# Patient Record
Sex: MALE | Race: WHITE | NOT HISPANIC OR LATINO | Employment: OTHER | URBAN - METROPOLITAN AREA
[De-identification: names, ages, dates, MRNs, and addresses within clinical notes are randomized per-mention and may not be internally consistent; named-entity substitution may affect disease eponyms.]

---

## 2023-02-15 PROBLEM — I25.118 CORONARY ARTERY DISEASE OF NATIVE ARTERY OF NATIVE HEART WITH STABLE ANGINA PECTORIS (CMS-HCC): Status: ACTIVE | Noted: 2023-02-15

## 2023-02-15 PROBLEM — M25.561 KNEE PAIN, RIGHT: Status: ACTIVE | Noted: 2023-02-15

## 2023-02-15 PROBLEM — I10 BENIGN ESSENTIAL HYPERTENSION: Status: ACTIVE | Noted: 2023-02-15

## 2023-02-15 PROBLEM — Z95.5 PRESENCE OF STENT IN CORONARY ARTERY: Status: ACTIVE | Noted: 2023-02-15

## 2023-02-15 PROBLEM — E78.5 DYSLIPIDEMIA, GOAL LDL BELOW 70: Status: ACTIVE | Noted: 2023-02-15

## 2023-02-15 PROBLEM — F10.20 UNCOMPLICATED ALCOHOL DEPENDENCE (MULTI): Status: ACTIVE | Noted: 2023-02-15

## 2023-02-15 PROBLEM — I25.10 ASHD (ARTERIOSCLEROTIC HEART DISEASE): Status: ACTIVE | Noted: 2023-02-15

## 2023-02-15 PROBLEM — L30.9 ECZEMA: Status: ACTIVE | Noted: 2023-02-15

## 2023-02-15 PROBLEM — F17.200 NEEDS SMOKING CESSATION EDUCATION: Status: ACTIVE | Noted: 2023-02-15

## 2023-02-15 PROBLEM — E55.9 VITAMIN D DEFICIENCY: Status: ACTIVE | Noted: 2023-02-15

## 2023-02-15 PROBLEM — F17.210 SMOKING GREATER THAN 40 PACK YEARS: Status: ACTIVE | Noted: 2023-02-15

## 2023-02-15 PROBLEM — E53.8 VITAMIN B12 DEFICIENCY: Status: ACTIVE | Noted: 2023-02-15

## 2023-02-15 PROBLEM — I73.9 PAD (PERIPHERAL ARTERY DISEASE) (CMS-HCC): Status: ACTIVE | Noted: 2023-02-15

## 2023-02-15 PROBLEM — F51.04 CHRONIC INSOMNIA: Status: ACTIVE | Noted: 2023-02-15

## 2023-02-15 PROBLEM — J44.89 COPD (CHRONIC OBSTRUCTIVE PULMONARY DISEASE) WITH CHRONIC BRONCHITIS (MULTI): Status: ACTIVE | Noted: 2023-02-15

## 2023-02-15 PROBLEM — I70.222 ATHEROSCLEROSIS OF NATIVE ARTERY OF LEFT LOWER EXTREMITY WITH REST PAIN (MULTI): Status: ACTIVE | Noted: 2023-02-15

## 2023-02-15 PROBLEM — M23.306 DEGENERATIVE TEAR OF MENISCUS OF RIGHT KNEE: Status: ACTIVE | Noted: 2023-02-15

## 2023-02-15 PROBLEM — J33.9 NASAL POLYPS: Status: ACTIVE | Noted: 2023-02-15

## 2023-02-15 PROBLEM — I73.9 CLAUDICATION, INTERMITTENT (CMS-HCC): Status: ACTIVE | Noted: 2023-02-15

## 2023-02-15 PROBLEM — R04.0 EPISTAXIS: Status: ACTIVE | Noted: 2023-02-15

## 2023-02-15 RX ORDER — ISOSORBIDE MONONITRATE 30 MG/1
1 TABLET, EXTENDED RELEASE ORAL
COMMUNITY
Start: 2019-12-03 | End: 2023-07-03

## 2023-02-15 RX ORDER — MAGNESIUM 200 MG
1 TABLET ORAL DAILY
COMMUNITY
Start: 2021-07-16

## 2023-02-15 RX ORDER — IPRATROPIUM BROMIDE AND ALBUTEROL SULFATE 2.5; .5 MG/3ML; MG/3ML
3 SOLUTION RESPIRATORY (INHALATION)
COMMUNITY
Start: 2020-02-24

## 2023-02-15 RX ORDER — MOMETASONE FUROATE 1 MG/G
OINTMENT TOPICAL
COMMUNITY
Start: 2021-01-25 | End: 2023-03-28 | Stop reason: SDUPTHER

## 2023-02-15 RX ORDER — CHOLECALCIFEROL (VITAMIN D3) 125 MCG
1 CAPSULE ORAL DAILY
COMMUNITY
Start: 2021-07-16

## 2023-02-15 RX ORDER — ATORVASTATIN CALCIUM 80 MG/1
1 TABLET, FILM COATED ORAL NIGHTLY
COMMUNITY
Start: 2018-04-18 | End: 2024-04-01 | Stop reason: SDUPTHER

## 2023-02-15 RX ORDER — TRAZODONE HYDROCHLORIDE 100 MG/1
1 TABLET ORAL NIGHTLY
COMMUNITY
Start: 2020-12-31 | End: 2023-07-31

## 2023-02-15 RX ORDER — LOSARTAN POTASSIUM 50 MG/1
50 TABLET ORAL EVERY 12 HOURS
COMMUNITY
Start: 2020-06-30

## 2023-02-15 RX ORDER — METOPROLOL TARTRATE 50 MG/1
1 TABLET ORAL EVERY 12 HOURS
COMMUNITY
Start: 2022-04-04 | End: 2023-07-31

## 2023-02-15 RX ORDER — BUDESONIDE AND FORMOTEROL FUMARATE DIHYDRATE 160; 4.5 UG/1; UG/1
2 AEROSOL RESPIRATORY (INHALATION) 2 TIMES DAILY
COMMUNITY
Start: 2020-09-18 | End: 2024-03-11

## 2023-02-15 RX ORDER — CLOPIDOGREL BISULFATE 75 MG/1
75 TABLET ORAL DAILY
COMMUNITY
Start: 2018-03-21

## 2023-02-15 RX ORDER — MUPIROCIN 20 MG/G
OINTMENT TOPICAL 3 TIMES DAILY
COMMUNITY
Start: 2020-05-15 | End: 2024-06-03 | Stop reason: WASHOUT

## 2023-02-15 RX ORDER — AMLODIPINE BESYLATE 10 MG/1
1 TABLET ORAL DAILY
COMMUNITY
Start: 2020-07-28 | End: 2023-09-12

## 2023-02-15 RX ORDER — ASPIRIN 81 MG/1
1 TABLET ORAL DAILY
COMMUNITY
Start: 2018-03-21

## 2023-02-15 RX ORDER — CYCLOBENZAPRINE HCL 10 MG
1 TABLET ORAL NIGHTLY PRN
COMMUNITY
Start: 2020-06-30 | End: 2023-07-20 | Stop reason: SDUPTHER

## 2023-03-15 LAB — CALCIDIOL (25 OH VITAMIN D3) (NG/ML) IN SER/PLAS: 33 NG/ML

## 2023-03-28 ENCOUNTER — TELEPHONE (OUTPATIENT)
Dept: PRIMARY CARE | Facility: CLINIC | Age: 63
End: 2023-03-28
Payer: COMMERCIAL

## 2023-03-28 DIAGNOSIS — L30.8 OTHER ECZEMA: ICD-10-CM

## 2023-03-28 RX ORDER — MOMETASONE FUROATE 1 MG/G
OINTMENT TOPICAL
Qty: 60 G | Refills: 1 | Status: SHIPPED | OUTPATIENT
Start: 2023-03-28 | End: 2023-11-13

## 2023-04-03 ENCOUNTER — TELEPHONE (OUTPATIENT)
Dept: PRIMARY CARE | Facility: CLINIC | Age: 63
End: 2023-04-03
Payer: COMMERCIAL

## 2023-04-03 NOTE — TELEPHONE ENCOUNTER
Voice mail message :  Mometasone Furoate 0.1% ointment was sent in    The Ointment is not available  /  they do have the creme    Is the creme okay ?

## 2023-04-06 ENCOUNTER — OFFICE VISIT (OUTPATIENT)
Dept: PRIMARY CARE | Facility: CLINIC | Age: 63
End: 2023-04-06
Payer: COMMERCIAL

## 2023-04-06 VITALS
WEIGHT: 173 LBS | SYSTOLIC BLOOD PRESSURE: 112 MMHG | HEART RATE: 62 BPM | BODY MASS INDEX: 27.15 KG/M2 | DIASTOLIC BLOOD PRESSURE: 60 MMHG | HEIGHT: 67 IN

## 2023-04-06 DIAGNOSIS — J44.89 COPD (CHRONIC OBSTRUCTIVE PULMONARY DISEASE) WITH CHRONIC BRONCHITIS (MULTI): ICD-10-CM

## 2023-04-06 DIAGNOSIS — F17.210 SMOKING GREATER THAN 40 PACK YEARS: ICD-10-CM

## 2023-04-06 DIAGNOSIS — J18.9 COMMUNITY ACQUIRED PNEUMONIA OF LEFT LOWER LOBE OF LUNG: Primary | ICD-10-CM

## 2023-04-06 DIAGNOSIS — I73.9 PAD (PERIPHERAL ARTERY DISEASE) (CMS-HCC): ICD-10-CM

## 2023-04-06 DIAGNOSIS — F17.200 NEEDS SMOKING CESSATION EDUCATION: ICD-10-CM

## 2023-04-06 DIAGNOSIS — I25.118 CORONARY ARTERY DISEASE OF NATIVE ARTERY OF NATIVE HEART WITH STABLE ANGINA PECTORIS (CMS-HCC): ICD-10-CM

## 2023-04-06 DIAGNOSIS — F10.20 UNCOMPLICATED ALCOHOL DEPENDENCE (MULTI): ICD-10-CM

## 2023-04-06 DIAGNOSIS — I10 BENIGN ESSENTIAL HYPERTENSION: ICD-10-CM

## 2023-04-06 DIAGNOSIS — I70.222 ATHEROSCLEROSIS OF NATIVE ARTERY OF LEFT LOWER EXTREMITY WITH REST PAIN (MULTI): ICD-10-CM

## 2023-04-06 DIAGNOSIS — E78.5 DYSLIPIDEMIA, GOAL LDL BELOW 70: ICD-10-CM

## 2023-04-06 DIAGNOSIS — I73.9 CLAUDICATION, INTERMITTENT (CMS-HCC): ICD-10-CM

## 2023-04-06 PROBLEM — R04.0 EPISTAXIS: Status: RESOLVED | Noted: 2023-02-15 | Resolved: 2023-04-06

## 2023-04-06 PROBLEM — M25.561 KNEE PAIN, RIGHT: Status: RESOLVED | Noted: 2023-02-15 | Resolved: 2023-04-06

## 2023-04-06 PROBLEM — I25.10 ASHD (ARTERIOSCLEROTIC HEART DISEASE): Status: RESOLVED | Noted: 2023-02-15 | Resolved: 2023-04-06

## 2023-04-06 PROCEDURE — 3078F DIAST BP <80 MM HG: CPT | Performed by: INTERNAL MEDICINE

## 2023-04-06 PROCEDURE — 99406 BEHAV CHNG SMOKING 3-10 MIN: CPT | Performed by: INTERNAL MEDICINE

## 2023-04-06 PROCEDURE — 99213 OFFICE O/P EST LOW 20 MIN: CPT | Performed by: INTERNAL MEDICINE

## 2023-04-06 PROCEDURE — 3074F SYST BP LT 130 MM HG: CPT | Performed by: INTERNAL MEDICINE

## 2023-04-06 RX ORDER — ALBUTEROL SULFATE 90 UG/1
2 AEROSOL, METERED RESPIRATORY (INHALATION) EVERY 4 HOURS PRN
Qty: 18 G | Refills: 11 | Status: SHIPPED | OUTPATIENT
Start: 2023-04-06 | End: 2023-04-11 | Stop reason: SDUPTHER

## 2023-04-06 RX ORDER — ALBUTEROL SULFATE 90 UG/1
2 AEROSOL, METERED RESPIRATORY (INHALATION) EVERY 4 HOURS PRN
COMMUNITY
End: 2023-04-06 | Stop reason: SDUPTHER

## 2023-04-06 ASSESSMENT — ANXIETY QUESTIONNAIRES
7. FEELING AFRAID AS IF SOMETHING AWFUL MIGHT HAPPEN: NOT AT ALL
2. NOT BEING ABLE TO STOP OR CONTROL WORRYING: NOT AT ALL
GAD7 TOTAL SCORE: 0
1. FEELING NERVOUS, ANXIOUS, OR ON EDGE: NOT AT ALL
4. TROUBLE RELAXING: NOT AT ALL
3. WORRYING TOO MUCH ABOUT DIFFERENT THINGS: NOT AT ALL
6. BECOMING EASILY ANNOYED OR IRRITABLE: NOT AT ALL
5. BEING SO RESTLESS THAT IT IS HARD TO SIT STILL: NOT AT ALL
IF YOU CHECKED OFF ANY PROBLEMS ON THIS QUESTIONNAIRE, HOW DIFFICULT HAVE THESE PROBLEMS MADE IT FOR YOU TO DO YOUR WORK, TAKE CARE OF THINGS AT HOME, OR GET ALONG WITH OTHER PEOPLE: NOT DIFFICULT AT ALL

## 2023-04-06 ASSESSMENT — PATIENT HEALTH QUESTIONNAIRE - PHQ9
1. LITTLE INTEREST OR PLEASURE IN DOING THINGS: NOT AT ALL
SUM OF ALL RESPONSES TO PHQ9 QUESTIONS 1 AND 2: 0
2. FEELING DOWN, DEPRESSED OR HOPELESS: NOT AT ALL

## 2023-04-06 ASSESSMENT — ENCOUNTER SYMPTOMS
OCCASIONAL FEELINGS OF UNSTEADINESS: 0
SHORTNESS OF BREATH: 1
COUGH: 1
LOSS OF SENSATION IN FEET: 0
DEPRESSION: 0

## 2023-04-06 ASSESSMENT — COLUMBIA-SUICIDE SEVERITY RATING SCALE - C-SSRS
1. IN THE PAST MONTH, HAVE YOU WISHED YOU WERE DEAD OR WISHED YOU COULD GO TO SLEEP AND NOT WAKE UP?: NO
6. HAVE YOU EVER DONE ANYTHING, STARTED TO DO ANYTHING, OR PREPARED TO DO ANYTHING TO END YOUR LIFE?: NO
2. HAVE YOU ACTUALLY HAD ANY THOUGHTS OF KILLING YOURSELF?: NO

## 2023-04-06 NOTE — ASSESSMENT & PLAN NOTE
Continue on atorvastatin maximal dose 80 mg daily with aspirin 81 mg daily with history of cardiovascular disease with stent severe peripheral vascular disease lower extremities

## 2023-04-06 NOTE — PROGRESS NOTES
"Subjective   Reason for Visit: Yadiel Basurto is an 62 y.o. male here for a Medicare Wellness visit.     Past Medical, Surgical, and Family History reviewed and updated in chart.    Reviewed all medications by prescribing practitioner or clinical pharmacist (such as prescriptions, OTCs, herbal therapies and supplements) and documented in the medical record.    Recently evaluated in the emergency room for pleuritic pain left lobe of lung infiltrates consistent with new pneumonia complete antibiotic course of therapy with nebulizer feeling better continues to smoke half pack cigarettes a day trying to quit        Patient Care Team:  Link Fajardo DO as PCP - General  Link Fajardo DO as PCP - [employer] PCP     Review of Systems   Respiratory:  Positive for cough and shortness of breath.        Objective   Vitals:  /60   Pulse 62   Ht 1.702 m (5' 7\")   Wt 78.5 kg (173 lb)   BMI 27.10 kg/m²       Physical Exam  Vitals and nursing note reviewed.   Constitutional:       General: He is not in acute distress.     Appearance: Normal appearance. He is well-developed. He is not toxic-appearing.   HENT:      Head: Normocephalic and atraumatic.      Right Ear: Tympanic membrane and external ear normal.      Left Ear: Tympanic membrane and external ear normal.      Nose: Nose normal.      Mouth/Throat:      Mouth: Mucous membranes are moist.      Pharynx: Oropharynx is clear. No oropharyngeal exudate or posterior oropharyngeal erythema.      Tonsils: No tonsillar exudate. 2+ on the right. 2+ on the left.   Eyes:      Extraocular Movements: Extraocular movements intact.      Conjunctiva/sclera: Conjunctivae normal.   Cardiovascular:      Rate and Rhythm: Normal rate and regular rhythm.      Pulses: Normal pulses.      Heart sounds: Normal heart sounds. No murmur heard.  Pulmonary:      Effort: Pulmonary effort is normal.      Breath sounds: Rhonchi present.   Abdominal:      General: Abdomen " is flat. Bowel sounds are normal.      Palpations: Abdomen is soft.   Musculoskeletal:      Cervical back: Neck supple.   Feet:      Right foot:      Skin integrity: Skin integrity normal. No ulcer, blister, skin breakdown, erythema, warmth or callus.      Toenail Condition: Right toenails are normal.      Left foot:      Skin integrity: Skin integrity normal. No ulcer, blister, skin breakdown, erythema, warmth or callus.      Toenail Condition: Left toenails are normal.   Lymphadenopathy:      Cervical: No cervical adenopathy.   Skin:     General: Skin is warm and dry.      Capillary Refill: Capillary refill takes more than 3 seconds.      Findings: No rash.   Neurological:      Mental Status: He is alert. Mental status is at baseline.      Sensory: Sensation is intact.   Psychiatric:         Mood and Affect: Mood normal.         Behavior: Behavior normal.         Thought Content: Thought content normal.         Judgment: Judgment normal.         Assessment/Plan   Problem List Items Addressed This Visit          Respiratory    COPD (chronic obstructive pulmonary disease) with chronic bronchitis (CMS/HCC)    Current Assessment & Plan     With nebulizers at home smoking cessationIncrease Symbicort to 2 puffs twice a day         Relevant Medications    albuterol 90 mcg/actuation inhaler    Other Relevant Orders    Comprehensive metabolic panel    CBC and Auto Differential    Lipid Panel    Hepatitis C antibody    CT lung screening low dose    Community acquired pneumonia of left lung - Primary    Current Assessment & Plan     Completed antibiotic course refill albuterol continue with nebulizer treatments at home         Relevant Medications    albuterol 90 mcg/actuation inhaler    Other Relevant Orders    Comprehensive metabolic panel    CBC and Auto Differential    Lipid Panel    Hepatitis C antibody    CT lung screening low dose       Circulatory    Benign essential hypertension    Coronary artery disease of native  artery of native heart with stable angina pectoris (CMS/HCC)    PAD (peripheral artery disease) (CMS/HCC)    Current Assessment & Plan     History of stent lower extremity circulation continue with aspirin and clopidogrel indefinitely         RESOLVED: Atherosclerosis of native artery of left lower extremity with rest pain (CMS/HCC)       Musculoskeletal    Claudication, intermittent (CMS/HCC)    Current Assessment & Plan     Continue with smoking cessation and regular exercise program with walking            Other    Dyslipidemia, goal LDL below 70    Current Assessment & Plan     Continue on atorvastatin maximal dose 80 mg daily with aspirin 81 mg daily with history of cardiovascular disease with stent severe peripheral vascular disease lower extremities         Relevant Orders    Lipid Panel    Needs smoking cessation education    Current Assessment & Plan     Smoking cessation education given         Relevant Orders    Comprehensive metabolic panel    CBC and Auto Differential    Lipid Panel    Hepatitis C antibody    CT lung screening low dose    Smoking greater than 40 pack years    Current Assessment & Plan     Has cut down to half pack of cigarettes a day continues to smoke given smoking cessation education scheduled surveillance CAT scan for lung cancer screening that was due in February         Relevant Medications    albuterol 90 mcg/actuation inhaler    Other Relevant Orders    CT lung screening low dose    Uncomplicated alcohol dependence (CMS/HCC)    Current Assessment & Plan     Continue to encourage reduction in alcohol intake

## 2023-04-06 NOTE — PROGRESS NOTES
"Subjective   Patient ID: Yadiel Basurto is a 62 y.o. male who presents for Follow-up.    HPI     Review of Systems    Objective   /60   Pulse 62   Ht 1.702 m (5' 7\")   Wt 78.5 kg (173 lb)   BMI 27.10 kg/m²     Physical Exam    Assessment/Plan          "

## 2023-04-06 NOTE — ASSESSMENT & PLAN NOTE
>>ASSESSMENT AND PLAN FOR PAD (PERIPHERAL ARTERY DISEASE) (CMS/[facility]) WRITTEN ON 4/6/2023  1:20 PM BY NESHA ENRIQUEZ, DO    History of stent lower extremity circulation continue with aspirin and clopidogrel indefinitely    >>ASSESSMENT AND PLAN FOR CLAUDICATION, INTERMITTENT (CMS/[facility]) WRITTEN ON 4/6/2023  1:20 PM BY NESHA ENRIQUEZ, DO    Continue with smoking cessation and regular exercise program with walking

## 2023-04-06 NOTE — ASSESSMENT & PLAN NOTE
Has cut down to half pack of cigarettes a day continues to smoke given smoking cessation education scheduled surveillance CAT scan for lung cancer screening that was due in February

## 2023-04-10 ENCOUNTER — TELEPHONE (OUTPATIENT)
Dept: PRIMARY CARE | Facility: CLINIC | Age: 63
End: 2023-04-10
Payer: COMMERCIAL

## 2023-04-10 DIAGNOSIS — J44.89 COPD (CHRONIC OBSTRUCTIVE PULMONARY DISEASE) WITH CHRONIC BRONCHITIS (MULTI): ICD-10-CM

## 2023-04-10 DIAGNOSIS — J18.9 COMMUNITY ACQUIRED PNEUMONIA OF LEFT LOWER LOBE OF LUNG: ICD-10-CM

## 2023-04-10 DIAGNOSIS — F17.210 SMOKING GREATER THAN 40 PACK YEARS: ICD-10-CM

## 2023-04-10 NOTE — TELEPHONE ENCOUNTER
Albuterol inhaler is not covered by his insurance    [employer] HFA is covered    Luis Alberto Elmore

## 2023-04-11 RX ORDER — ALBUTEROL SULFATE 90 UG/1
2 AEROSOL, METERED RESPIRATORY (INHALATION) EVERY 4 HOURS PRN
Qty: 18 G | Refills: 3 | Status: SHIPPED | OUTPATIENT
Start: 2023-04-11 | End: 2024-04-10

## 2023-04-21 ENCOUNTER — TELEPHONE (OUTPATIENT)
Dept: PRIMARY CARE | Facility: CLINIC | Age: 63
End: 2023-04-21
Payer: COMMERCIAL

## 2023-04-21 RX ORDER — LOSARTAN POTASSIUM 50 MG/1
TABLET ORAL EVERY 12 HOURS
COMMUNITY
Start: 2020-06-30 | End: 2023-10-06 | Stop reason: SDUPTHER

## 2023-04-21 RX ORDER — ALBUTEROL SULFATE 0.83 MG/ML
SOLUTION RESPIRATORY (INHALATION) EVERY 4 HOURS PRN
COMMUNITY
Start: 2023-04-10

## 2023-04-21 RX ORDER — DOXYCYCLINE HYCLATE 100 MG
1 TABLET ORAL EVERY 12 HOURS
COMMUNITY
Start: 2022-12-15 | End: 2023-04-24

## 2023-04-21 RX ORDER — MUPIROCIN 20 MG/G
OINTMENT TOPICAL
COMMUNITY
Start: 2020-05-15 | End: 2024-06-03 | Stop reason: WASHOUT

## 2023-04-21 NOTE — TELEPHONE ENCOUNTER
Patient stated his left side has been bothering him for the past month. He went to the ER and was given antibiotic (Diagnosed with pneumonia)-he was starting to feel better but the past week it's really been bothering him.       Erika

## 2023-04-22 DIAGNOSIS — J18.9 COMMUNITY ACQUIRED PNEUMONIA OF LEFT LOWER LOBE OF LUNG: Primary | ICD-10-CM

## 2023-04-24 RX ORDER — DOXYCYCLINE HYCLATE 100 MG
TABLET ORAL
Qty: 20 TABLET | Refills: 0 | Status: SHIPPED
Start: 2023-04-24 | End: 2023-10-06 | Stop reason: ALTCHOICE

## 2023-05-10 ENCOUNTER — TELEPHONE (OUTPATIENT)
Dept: PRIMARY CARE | Facility: CLINIC | Age: 63
End: 2023-05-10
Payer: COMMERCIAL

## 2023-05-10 DIAGNOSIS — J44.89 COPD (CHRONIC OBSTRUCTIVE PULMONARY DISEASE) WITH CHRONIC BRONCHITIS (MULTI): ICD-10-CM

## 2023-05-10 DIAGNOSIS — I10 BENIGN ESSENTIAL HYPERTENSION: ICD-10-CM

## 2023-05-10 DIAGNOSIS — E78.5 DYSLIPIDEMIA, GOAL LDL BELOW 70: ICD-10-CM

## 2023-05-10 SDOH — ECONOMIC STABILITY: FOOD INSECURITY: WITHIN THE PAST 12 MONTHS, THE FOOD YOU BOUGHT JUST DIDN'T LAST AND YOU DIDN'T HAVE MONEY TO GET MORE.: OFTEN TRUE

## 2023-05-10 SDOH — ECONOMIC STABILITY: FOOD INSECURITY: WITHIN THE PAST 12 MONTHS, YOU WORRIED THAT YOUR FOOD WOULD RUN OUT BEFORE YOU GOT MONEY TO BUY MORE.: OFTEN TRUE

## 2023-06-21 ENCOUNTER — TELEPHONE (OUTPATIENT)
Dept: PRIMARY CARE | Facility: CLINIC | Age: 63
End: 2023-06-21
Payer: COMMERCIAL

## 2023-06-21 DIAGNOSIS — F17.210 SMOKING GREATER THAN 40 PACK YEARS: Primary | ICD-10-CM

## 2023-06-21 NOTE — TELEPHONE ENCOUNTER
He had to cancel his low dose CT because of issue with his insurance.    He has things straightened out & needs new order , so he can schedule again

## 2023-07-03 DIAGNOSIS — I25.118 CORONARY ARTERY DISEASE OF NATIVE ARTERY OF NATIVE HEART WITH STABLE ANGINA PECTORIS (CMS-HCC): Primary | ICD-10-CM

## 2023-07-03 RX ORDER — ISOSORBIDE MONONITRATE 30 MG/1
TABLET, EXTENDED RELEASE ORAL
Qty: 90 TABLET | Refills: 3 | Status: SHIPPED | OUTPATIENT
Start: 2023-07-03

## 2023-07-03 RX ORDER — LOSARTAN POTASSIUM 50 MG/1
TABLET ORAL
Qty: 180 TABLET | Refills: 3 | Status: SHIPPED
Start: 2023-07-03 | End: 2023-10-06 | Stop reason: SDUPTHER

## 2023-07-19 DIAGNOSIS — G89.29 CHRONIC BILATERAL LOW BACK PAIN WITHOUT SCIATICA: Primary | ICD-10-CM

## 2023-07-19 DIAGNOSIS — M54.50 CHRONIC BILATERAL LOW BACK PAIN WITHOUT SCIATICA: Primary | ICD-10-CM

## 2023-07-20 RX ORDER — CYCLOBENZAPRINE HCL 10 MG
10 TABLET ORAL NIGHTLY PRN
Qty: 90 TABLET | Refills: 3 | Status: SHIPPED | OUTPATIENT
Start: 2023-07-20

## 2023-07-31 DIAGNOSIS — I25.118 CORONARY ARTERY DISEASE OF NATIVE ARTERY OF NATIVE HEART WITH STABLE ANGINA PECTORIS (CMS-HCC): ICD-10-CM

## 2023-07-31 DIAGNOSIS — F51.04 CHRONIC INSOMNIA: Primary | ICD-10-CM

## 2023-07-31 RX ORDER — TRAZODONE HYDROCHLORIDE 100 MG/1
100 TABLET ORAL NIGHTLY
Qty: 90 TABLET | Refills: 3 | Status: SHIPPED | OUTPATIENT
Start: 2023-07-31

## 2023-07-31 RX ORDER — METOPROLOL TARTRATE 50 MG/1
50 TABLET ORAL EVERY 12 HOURS
Qty: 180 TABLET | Refills: 3 | Status: SHIPPED | OUTPATIENT
Start: 2023-07-31

## 2023-09-12 DIAGNOSIS — I10 BENIGN ESSENTIAL HYPERTENSION: Primary | ICD-10-CM

## 2023-09-12 RX ORDER — AMLODIPINE BESYLATE 10 MG/1
10 TABLET ORAL DAILY
Qty: 90 TABLET | Refills: 3 | Status: SHIPPED | OUTPATIENT
Start: 2023-09-12

## 2023-09-30 ENCOUNTER — HOSPITAL ENCOUNTER (OUTPATIENT)
Dept: RADIOLOGY | Facility: HOSPITAL | Age: 63
Discharge: HOME | End: 2023-09-30
Payer: COMMERCIAL

## 2023-09-30 DIAGNOSIS — F17.210 NICOTINE DEPENDENCE, CIGARETTES, UNCOMPLICATED: ICD-10-CM

## 2023-09-30 PROCEDURE — 71271 CT THORAX LUNG CANCER SCR C-: CPT | Performed by: RADIOLOGY

## 2023-09-30 PROCEDURE — 71271 CT THORAX LUNG CANCER SCR C-: CPT

## 2023-10-05 ENCOUNTER — LAB (OUTPATIENT)
Dept: LAB | Facility: LAB | Age: 63
End: 2023-10-05
Payer: COMMERCIAL

## 2023-10-05 DIAGNOSIS — E78.5 HYPERLIPIDEMIA, UNSPECIFIED: Primary | ICD-10-CM

## 2023-10-05 DIAGNOSIS — J44.89 COPD (CHRONIC OBSTRUCTIVE PULMONARY DISEASE) WITH CHRONIC BRONCHITIS (MULTI): ICD-10-CM

## 2023-10-05 DIAGNOSIS — F17.200 NEEDS SMOKING CESSATION EDUCATION: ICD-10-CM

## 2023-10-05 DIAGNOSIS — Z23 FLU VACCINE NEED: ICD-10-CM

## 2023-10-05 DIAGNOSIS — J18.9 COMMUNITY ACQUIRED PNEUMONIA OF LEFT LOWER LOBE OF LUNG: ICD-10-CM

## 2023-10-05 LAB
BASOPHILS # BLD AUTO: 0.07 X10*3/UL (ref 0–0.1)
BASOPHILS NFR BLD AUTO: 0.7 %
EOSINOPHIL # BLD AUTO: 0.12 X10*3/UL (ref 0–0.7)
EOSINOPHIL NFR BLD AUTO: 1.1 %
ERYTHROCYTE [DISTWIDTH] IN BLOOD BY AUTOMATED COUNT: 14.4 % (ref 11.5–14.5)
HCT VFR BLD AUTO: 46.8 % (ref 41–52)
HCV AB SER QL: NONREACTIVE
HGB BLD-MCNC: 15.4 G/DL (ref 13.5–17.5)
IMM GRANULOCYTES # BLD AUTO: 0.07 X10*3/UL (ref 0–0.7)
IMM GRANULOCYTES NFR BLD AUTO: 0.7 % (ref 0–0.9)
LYMPHOCYTES # BLD AUTO: 1.7 X10*3/UL (ref 1.2–4.8)
LYMPHOCYTES NFR BLD AUTO: 16 %
MCH RBC QN AUTO: 34.2 PG (ref 26–34)
MCHC RBC AUTO-ENTMCNC: 32.9 G/DL (ref 32–36)
MCV RBC AUTO: 104 FL (ref 80–100)
MONOCYTES # BLD AUTO: 0.78 X10*3/UL (ref 0.1–1)
MONOCYTES NFR BLD AUTO: 7.3 %
NEUTROPHILS # BLD AUTO: 7.9 X10*3/UL (ref 1.2–7.7)
NEUTROPHILS NFR BLD AUTO: 74.2 %
NRBC BLD-RTO: 0 /100 WBCS (ref 0–0)
PLATELET # BLD AUTO: 291 X10*3/UL (ref 150–450)
PMV BLD AUTO: 9.5 FL (ref 7.5–11.5)
RBC # BLD AUTO: 4.5 X10*6/UL (ref 4.5–5.9)
WBC # BLD AUTO: 10.6 X10*3/UL (ref 4.4–11.3)

## 2023-10-05 PROCEDURE — 36415 COLL VENOUS BLD VENIPUNCTURE: CPT

## 2023-10-05 PROCEDURE — 86803 HEPATITIS C AB TEST: CPT

## 2023-10-05 PROCEDURE — 85025 COMPLETE CBC W/AUTO DIFF WBC: CPT

## 2023-10-06 ENCOUNTER — OFFICE VISIT (OUTPATIENT)
Dept: PRIMARY CARE | Facility: CLINIC | Age: 63
End: 2023-10-06
Payer: COMMERCIAL

## 2023-10-06 VITALS
SYSTOLIC BLOOD PRESSURE: 122 MMHG | BODY MASS INDEX: 27.31 KG/M2 | HEIGHT: 67 IN | HEART RATE: 68 BPM | DIASTOLIC BLOOD PRESSURE: 70 MMHG | WEIGHT: 174 LBS

## 2023-10-06 DIAGNOSIS — I25.118 CORONARY ARTERY DISEASE OF NATIVE ARTERY OF NATIVE HEART WITH STABLE ANGINA PECTORIS (CMS-HCC): ICD-10-CM

## 2023-10-06 DIAGNOSIS — I73.9 PAD (PERIPHERAL ARTERY DISEASE) (CMS-HCC): ICD-10-CM

## 2023-10-06 DIAGNOSIS — I10 BENIGN ESSENTIAL HYPERTENSION: ICD-10-CM

## 2023-10-06 DIAGNOSIS — I73.9 CLAUDICATION, INTERMITTENT (CMS-HCC): ICD-10-CM

## 2023-10-06 DIAGNOSIS — E78.5 DYSLIPIDEMIA, GOAL LDL BELOW 70: ICD-10-CM

## 2023-10-06 DIAGNOSIS — F17.200 NEEDS SMOKING CESSATION EDUCATION: ICD-10-CM

## 2023-10-06 DIAGNOSIS — I70.222 ATHEROSCLEROSIS OF NATIVE ARTERY OF LEFT LOWER EXTREMITY WITH REST PAIN (MULTI): ICD-10-CM

## 2023-10-06 DIAGNOSIS — Z23 FLU VACCINE NEED: ICD-10-CM

## 2023-10-06 DIAGNOSIS — J44.89 COPD (CHRONIC OBSTRUCTIVE PULMONARY DISEASE) WITH CHRONIC BRONCHITIS (MULTI): ICD-10-CM

## 2023-10-06 DIAGNOSIS — Z00.00 MEDICARE ANNUAL WELLNESS VISIT, SUBSEQUENT: Primary | ICD-10-CM

## 2023-10-06 DIAGNOSIS — J01.01 ACUTE RECURRENT MAXILLARY SINUSITIS: ICD-10-CM

## 2023-10-06 DIAGNOSIS — F10.20 UNCOMPLICATED ALCOHOL DEPENDENCE (MULTI): ICD-10-CM

## 2023-10-06 PROBLEM — J18.9 COMMUNITY ACQUIRED PNEUMONIA OF LEFT LUNG: Status: RESOLVED | Noted: 2023-04-06 | Resolved: 2023-10-06

## 2023-10-06 PROBLEM — I70.229: Status: ACTIVE | Noted: 2023-10-06

## 2023-10-06 PROBLEM — N40.0 BENIGN PROSTATIC HYPERPLASIA WITHOUT LOWER URINARY TRACT SYMPTOMS: Status: ACTIVE | Noted: 2023-10-06

## 2023-10-06 PROCEDURE — 99214 OFFICE O/P EST MOD 30 MIN: CPT | Performed by: INTERNAL MEDICINE

## 2023-10-06 PROCEDURE — 99497 ADVNCD CARE PLAN 30 MIN: CPT | Performed by: INTERNAL MEDICINE

## 2023-10-06 PROCEDURE — G0442 ANNUAL ALCOHOL SCREEN 15 MIN: HCPCS | Performed by: INTERNAL MEDICINE

## 2023-10-06 PROCEDURE — 99406 BEHAV CHNG SMOKING 3-10 MIN: CPT | Performed by: INTERNAL MEDICINE

## 2023-10-06 PROCEDURE — G0444 DEPRESSION SCREEN ANNUAL: HCPCS | Performed by: INTERNAL MEDICINE

## 2023-10-06 PROCEDURE — 90686 IIV4 VACC NO PRSV 0.5 ML IM: CPT | Performed by: INTERNAL MEDICINE

## 2023-10-06 PROCEDURE — 3074F SYST BP LT 130 MM HG: CPT | Performed by: INTERNAL MEDICINE

## 2023-10-06 PROCEDURE — 3078F DIAST BP <80 MM HG: CPT | Performed by: INTERNAL MEDICINE

## 2023-10-06 PROCEDURE — G0008 ADMIN INFLUENZA VIRUS VAC: HCPCS | Performed by: INTERNAL MEDICINE

## 2023-10-06 PROCEDURE — G0446 INTENS BEHAVE THER CARDIO DX: HCPCS | Performed by: INTERNAL MEDICINE

## 2023-10-06 PROCEDURE — G0439 PPPS, SUBSEQ VISIT: HCPCS | Performed by: INTERNAL MEDICINE

## 2023-10-06 RX ORDER — CILOSTAZOL 100 MG/1
100 TABLET ORAL 2 TIMES DAILY
Qty: 60 TABLET | Refills: 11 | Status: SHIPPED | OUTPATIENT
Start: 2023-10-06 | End: 2024-10-05

## 2023-10-06 RX ORDER — CEFDINIR 300 MG/1
300 CAPSULE ORAL 2 TIMES DAILY
Qty: 20 CAPSULE | Refills: 0 | Status: SHIPPED | OUTPATIENT
Start: 2023-10-06 | End: 2023-10-17

## 2023-10-06 ASSESSMENT — ENCOUNTER SYMPTOMS
OCCASIONAL FEELINGS OF UNSTEADINESS: 0
LOSS OF SENSATION IN FEET: 0
DEPRESSION: 0

## 2023-10-06 ASSESSMENT — ANXIETY QUESTIONNAIRES
2. NOT BEING ABLE TO STOP OR CONTROL WORRYING: NOT AT ALL
6. BECOMING EASILY ANNOYED OR IRRITABLE: NOT AT ALL
1. FEELING NERVOUS, ANXIOUS, OR ON EDGE: NOT AT ALL
5. BEING SO RESTLESS THAT IT IS HARD TO SIT STILL: NOT AT ALL
7. FEELING AFRAID AS IF SOMETHING AWFUL MIGHT HAPPEN: NOT AT ALL
IF YOU CHECKED OFF ANY PROBLEMS ON THIS QUESTIONNAIRE, HOW DIFFICULT HAVE THESE PROBLEMS MADE IT FOR YOU TO DO YOUR WORK, TAKE CARE OF THINGS AT HOME, OR GET ALONG WITH OTHER PEOPLE: NOT DIFFICULT AT ALL
GAD7 TOTAL SCORE: 0
4. TROUBLE RELAXING: NOT AT ALL
3. WORRYING TOO MUCH ABOUT DIFFERENT THINGS: NOT AT ALL

## 2023-10-06 ASSESSMENT — ACTIVITIES OF DAILY LIVING (ADL)
MANAGING_FINANCES: INDEPENDENT
GROCERY_SHOPPING: INDEPENDENT
DOING_HOUSEWORK: INDEPENDENT
BATHING: INDEPENDENT
TAKING_MEDICATION: INDEPENDENT
DRESSING: INDEPENDENT

## 2023-10-06 NOTE — PROGRESS NOTES
Alcohol consumption becomes hazardous when consuming women [place] over 65 years old greater than 7 drinks per week or greater than 3 drinks per occasion for men greater than 14 drinks per week or greater than 4 drinks per occasion.  I spent 15 minutes screening for alcohol use.Depression and anxiety screening completed   PHQ9 score   GAD7 score   I spent 15 minutes obtaining and discussing depression screening using PHQ 2 questions with results documented in chart.  Screening using PHQ-9 and RON-7 scores were used for follow-up with treatment and referral plan discussed.      I spent more than 3 minutes but less than 10 minutes counseling patient about need for smoking/tobacco cessation and how I can get support efforts when patient is ready to quit.  Discussed nicotine replacement therapy such as bupropion nicotine replacement therapy and Chantix .  Hypnosis,support groups,and acupuncture are other potential options.I spent 15 minutes face-to-face with this individual discussing the cardiovascular risk and behavioral therapies of nutritional choices exercise and elimination of habits contributing to risk .We agreed on a plan how they may be able to reduce  current cardiovascular risk.  Per patient with calculation greater than 10% aspirin use was discussed and encouraged unless known allergy or increased risk of bleeding contraindicates use patient's 10-year CV risk estimate calculates:I spent greater than 15 minutes discussing advance care planning including the explanation and discussion of advanced directives.  If patient does not have current up-to-date documents examples and information provided on how to create both living will and power of .  toolkit was given to patient and was encouraged to work out completing these documents.Subjective   Reason for Visit: Yadiel Basurto is an 63 y.o. male here for a Medicare Wellness visit.     Past Medical, Surgical, and Family History reviewed and updated in  "chart.    Reviewed all medications by prescribing practitioner or clinical pharmacist (such as prescriptions, OTCs, herbal therapies and supplements) and documented in the medical record.    HPI    Patient Care Team:  Link Fajardo DO as PCP - General  Link Fajardo DO as PCP - [employer] PCP     Review of Systems   All other systems reviewed and are negative.      Objective   Vitals:  /70 (BP Location: Left arm, Patient Position: Sitting)   Pulse 68   Ht 1.702 m (5' 7\")   Wt 78.9 kg (174 lb)   BMI 27.25 kg/m²       Physical Exam    Assessment/Plan   Problem List Items Addressed This Visit       Benign essential hypertension    Relevant Medications    cefdinir (Omnicef) 300 mg capsule    cilostazol (Pletal) 100 mg tablet    Other Relevant Orders    Follow Up In [facility] - PCP - Established    Vascular US PVR with exercise    PSA    Comprehensive metabolic panel    Lipid Panel    Follow Up In [facility] - PCP - Established    Coronary artery disease of native artery of native heart with stable angina pectoris (CMS/HCC)    Current Assessment & Plan     Stable following with cardiology continue aspirin Plavix indefinitely amlodipine 10 mg daily with losartan 50 mg daily isosorbide mononitrate 30 mg daily for chronic stable angina and metoprolol tartrate 50 mg every 12 hours         Relevant Medications    cefdinir (Omnicef) 300 mg capsule    cilostazol (Pletal) 100 mg tablet    Other Relevant Orders    Follow Up In [facility] - PCP - Established    Vascular US PVR with exercise    PSA    Comprehensive metabolic panel    Lipid Panel    Follow Up In [facility] - PCP - Established    PAD (peripheral artery disease) (CMS/HCC)    Current Assessment & Plan     Worsening peripheral arterial insufficiency with claudication symptoms we will start cilostazol 100 mg twice a day aggressively encourage smoking cessation patient smoking up to 2 packs " of cigarettes a day continue with exercise we will reevaluate arterial studies with exercise PVR and reevaluate in 3 months         Relevant Medications    cefdinir (Omnicef) 300 mg capsule    cilostazol (Pletal) 100 mg tablet    Other Relevant Orders    Follow Up In [facility] - PCP - Established    Vascular US PVR with exercise    PSA    Comprehensive metabolic panel    Lipid Panel    Follow Up In [facility] - PCP - Established    Claudication, intermittent (CMS/HCC)    Current Assessment & Plan     Reevaluate vascular studies of the lower extremities         Relevant Medications    cefdinir (Omnicef) 300 mg capsule    cilostazol (Pletal) 100 mg tablet    Other Relevant Orders    Follow Up In [facility] - PCP - Established    Vascular US PVR with exercise    PSA    Comprehensive metabolic panel    Lipid Panel    Follow Up In [facility] - PCP - Established    COPD (chronic obstructive pulmonary disease) with chronic bronchitis    Current Assessment & Plan     Continue Symbicort with albuterol         Relevant Medications    cefdinir (Omnicef) 300 mg capsule    cilostazol (Pletal) 100 mg tablet    Other Relevant Orders    Follow Up In [facility] - PCP - Established    Vascular US PVR with exercise    PSA    Comprehensive metabolic panel    Lipid Panel    Follow Up In [facility] - PCP - Established    Dyslipidemia, goal LDL below 70    Current Assessment & Plan     Reevaluate lipid profile this a.m. vascular disease with atorvastatin 80 mg daily         Relevant Medications    cefdinir (Omnicef) 300 mg capsule    cilostazol (Pletal) 100 mg tablet    Other Relevant Orders    Follow Up In [facility] - PCP - Established    Vascular US PVR with exercise    PSA    Comprehensive metabolic panel    Lipid Panel    Follow Up In [facility] - PCP - Established    Needs smoking cessation education    Current Assessment & Plan     Work on  smoking cessation patient is attempting to cut down to half pack of cigarettes a day         Relevant Medications    cefdinir (Omnicef) 300 mg capsule    cilostazol (Pletal) 100 mg tablet    Other Relevant Orders    Follow Up In [facility] - PCP - Established    Vascular US PVR with exercise    PSA    Comprehensive metabolic panel    Lipid Panel    Follow Up In [facility] - PCP - Established    Uncomplicated alcohol dependence (CMS/HCC)    Current Assessment & Plan     Patient continues to drink up to 6 pack of beer a day continue to encourage reduction in smoking and alcohol intake to reduce risk of cardiovascular events         Relevant Medications    cefdinir (Omnicef) 300 mg capsule    cilostazol (Pletal) 100 mg tablet    Other Relevant Orders    Follow Up In [facility] - PCP - Established    Vascular US PVR with exercise    PSA    Comprehensive metabolic panel    Lipid Panel    Follow Up In [facility] - PCP - Established    Medicare annual wellness visit, subsequent - Primary    Current Assessment & Plan     Updated influenza vaccination today given smoking cessation         Atherosclerosis of native arteries of extremities with rest pain, unspecified extremity (CMS/HCC)    Relevant Medications    cefdinir (Omnicef) 300 mg capsule    cilostazol (Pletal) 100 mg tablet    Other Relevant Orders    Follow Up In [facility] - PCP - Established    Vascular US PVR with exercise    PSA    Comprehensive metabolic panel    Lipid Panel    Follow Up In [facility] - PCP - Established    Flu vaccine need    Relevant Medications    cefdinir (Omnicef) 300 mg capsule    cilostazol (Pletal) 100 mg tablet    Other Relevant Orders    Follow Up In [facility] - PCP - Established    Vascular US PVR with exercise    PSA    Comprehensive metabolic panel    Lipid Panel    Follow Up In [facility] - PCP - Established    Acute recurrent maxillary  sinusitis    Relevant Medications    cefdinir (Omnicef) 300 mg capsule

## 2023-10-06 NOTE — PROGRESS NOTES
"Subjective   Reason for Visit: Yadiel Basurto is an 63 y.o. male here for a Medicare Wellness visit.     Past Medical, Surgical, and Family History reviewed and updated in chart.         HPI    Patient Care Team:  Link Fajardo DO as PCP - General  Link Fajardo DO as PCP - [employer] PCP     Review of Systems    Objective   Vitals:  /70 (BP Location: Left arm, Patient Position: Sitting)   Pulse 68   Ht 1.702 m (5' 7\")   Wt 78.9 kg (174 lb)   BMI 27.25 kg/m²       Physical Exam    Assessment/Plan   Problem List Items Addressed This Visit       Benign essential hypertension    Coronary artery disease of native artery of native heart with stable angina pectoris (CMS/HCC)    PAD (peripheral artery disease) (CMS/HCC)    Claudication, intermittent (CMS/HCC)    COPD (chronic obstructive pulmonary disease) with chronic bronchitis    Dyslipidemia, goal LDL below 70    Needs smoking cessation education    Smoking greater than 40 pack years    Uncomplicated alcohol dependence (CMS/HCC)    Community acquired pneumonia of left lung     Other Visit Diagnoses       Atherosclerosis of native artery of left lower extremity with rest pain (CMS/HCC)        Flu vaccine need                   "

## 2023-10-06 NOTE — ASSESSMENT & PLAN NOTE
>>ASSESSMENT AND PLAN FOR PAD (PERIPHERAL ARTERY DISEASE) (CMS/[facility]) WRITTEN ON 10/6/2023 11:08 AM BY NESHA ENRIQUEZ, DO    Worsening peripheral arterial insufficiency with claudication symptoms we will start cilostazol 100 mg twice a day aggressively encourage smoking cessation patient smoking up to 2 packs of cigarettes a day continue with exercise we will reevaluate arterial studies with exercise PVR and reevaluate in 3 months    >>ASSESSMENT AND PLAN FOR CLAUDICATION, INTERMITTENT (CMS/[facility]) WRITTEN ON 10/6/2023 11:10 AM BY NESHA ENRIQUEZ, DO    Reevaluate vascular studies of the lower extremities

## 2023-10-06 NOTE — ASSESSMENT & PLAN NOTE
Stable following with cardiology continue aspirin Plavix indefinitely amlodipine 10 mg daily with losartan 50 mg daily isosorbide mononitrate 30 mg daily for chronic stable angina and metoprolol tartrate 50 mg every 12 hours

## 2023-10-06 NOTE — ASSESSMENT & PLAN NOTE
Patient continues to drink up to 6 pack of beer a day continue to encourage reduction in smoking and alcohol intake to reduce risk of cardiovascular events

## 2023-10-16 DIAGNOSIS — I70.222 ATHEROSCLEROSIS OF NATIVE ARTERY OF LEFT LOWER EXTREMITY WITH REST PAIN (MULTI): ICD-10-CM

## 2023-10-16 DIAGNOSIS — I25.118 CORONARY ARTERY DISEASE OF NATIVE ARTERY OF NATIVE HEART WITH STABLE ANGINA PECTORIS (CMS-HCC): ICD-10-CM

## 2023-10-16 DIAGNOSIS — I10 BENIGN ESSENTIAL HYPERTENSION: ICD-10-CM

## 2023-10-16 DIAGNOSIS — F10.20 UNCOMPLICATED ALCOHOL DEPENDENCE (MULTI): ICD-10-CM

## 2023-10-16 DIAGNOSIS — F17.200 NEEDS SMOKING CESSATION EDUCATION: ICD-10-CM

## 2023-10-16 DIAGNOSIS — I73.9 PAD (PERIPHERAL ARTERY DISEASE) (CMS-HCC): ICD-10-CM

## 2023-10-16 DIAGNOSIS — E78.5 DYSLIPIDEMIA, GOAL LDL BELOW 70: ICD-10-CM

## 2023-10-16 DIAGNOSIS — J44.89 COPD (CHRONIC OBSTRUCTIVE PULMONARY DISEASE) WITH CHRONIC BRONCHITIS (MULTI): ICD-10-CM

## 2023-10-16 DIAGNOSIS — J01.01 ACUTE RECURRENT MAXILLARY SINUSITIS: ICD-10-CM

## 2023-10-16 DIAGNOSIS — I73.9 CLAUDICATION, INTERMITTENT (CMS-HCC): ICD-10-CM

## 2023-10-16 DIAGNOSIS — Z23 FLU VACCINE NEED: ICD-10-CM

## 2023-10-17 RX ORDER — CEFDINIR 300 MG/1
300 CAPSULE ORAL 2 TIMES DAILY
Qty: 20 CAPSULE | Refills: 0 | Status: SHIPPED | OUTPATIENT
Start: 2023-10-17 | End: 2023-10-27

## 2023-10-18 ENCOUNTER — TELEPHONE (OUTPATIENT)
Dept: PRIMARY CARE | Facility: CLINIC | Age: 63
End: 2023-10-18

## 2023-10-18 DIAGNOSIS — Z59.48 FOOD DEPRIVATION: ICD-10-CM

## 2023-10-18 SDOH — ECONOMIC STABILITY - FOOD INSECURITY: OTHER SPECIFIED LACK OF ADEQUATE FOOD: Z59.48

## 2023-11-13 DIAGNOSIS — L30.8 OTHER ECZEMA: ICD-10-CM

## 2023-11-13 RX ORDER — MOMETASONE FUROATE 1 MG/G
CREAM TOPICAL
Qty: 60 G | Refills: 1 | Status: SHIPPED
Start: 2023-11-13 | End: 2024-06-03 | Stop reason: WASHOUT

## 2023-12-26 ENCOUNTER — APPOINTMENT (OUTPATIENT)
Dept: NUTRITION | Facility: HOSPITAL | Age: 63
End: 2023-12-26
Payer: COMMERCIAL

## 2024-01-19 ENCOUNTER — APPOINTMENT (OUTPATIENT)
Dept: PRIMARY CARE | Facility: CLINIC | Age: 64
End: 2024-01-19

## 2024-02-15 ENCOUNTER — APPOINTMENT (OUTPATIENT)
Dept: CARDIOLOGY | Facility: CLINIC | Age: 64
End: 2024-02-15
Payer: COMMERCIAL

## 2024-03-04 DIAGNOSIS — R91.1 INCIDENTAL LUNG NODULE, > 3MM AND < 8MM: Primary | ICD-10-CM

## 2024-03-05 ENCOUNTER — TELEPHONE (OUTPATIENT)
Dept: PRIMARY CARE | Facility: CLINIC | Age: 64
End: 2024-03-05

## 2024-03-05 DIAGNOSIS — Z59.48 FOOD DEPRIVATION: ICD-10-CM

## 2024-03-05 SDOH — ECONOMIC STABILITY: FOOD INSECURITY: WITHIN THE PAST 12 MONTHS, THE FOOD YOU BOUGHT JUST DIDN'T LAST AND YOU DIDN'T HAVE MONEY TO GET MORE.: OFTEN TRUE

## 2024-03-05 SDOH — ECONOMIC STABILITY: FOOD INSECURITY: WITHIN THE PAST 12 MONTHS, YOU WORRIED THAT YOUR FOOD WOULD RUN OUT BEFORE YOU GOT MONEY TO BUY MORE.: OFTEN TRUE

## 2024-03-05 SDOH — ECONOMIC STABILITY - FOOD INSECURITY: OTHER SPECIFIED LACK OF ADEQUATE FOOD: Z59.48

## 2024-03-05 NOTE — TELEPHONE ENCOUNTER
Asking for a renew for food for life, he  already is in the program but needs it renewed   Also asking if the CT without Contrast was approved by his insurance?

## 2024-03-10 DIAGNOSIS — J44.89 COPD (CHRONIC OBSTRUCTIVE PULMONARY DISEASE) WITH CHRONIC BRONCHITIS (MULTI): ICD-10-CM

## 2024-03-11 RX ORDER — BUDESONIDE AND FORMOTEROL FUMARATE DIHYDRATE 160; 4.5 UG/1; UG/1
2 AEROSOL RESPIRATORY (INHALATION) 2 TIMES DAILY
Qty: 30.6 G | Refills: 3 | Status: SHIPPED | OUTPATIENT
Start: 2024-03-11 | End: 2024-06-03 | Stop reason: SDUPTHER

## 2024-03-13 NOTE — PROGRESS NOTES
"Counseling:  The patient was counseled regarding diagnostic results, instructions for management, risk factor reductions, prognosis, patient and family education, impressions, risks and benefits of treatment options and importance of compliance with treatment.      Chief Complaint:   The patient presents today for 6-month followup of PVD, CAD and dyslipidemia.      History Of Present Illness:    Yadiel Basurto is a 63 year old male patient who presents today for 6-month followup of PVD, CAD and dyslipidemia. His PMH is significant for HTN, COPD, CAD, PVD s/p PTA of left SFA in 2018 and dyslipidemia. Over the past 6 months, the patient states that he has done well from a cardiac standpoint. He denies any CP, chest discomfort or SOB. He reports intermittent claudication and LE edema for which he wears diabetic socks. Unfortunately, the patient continues to smoke, although at reduced quantity.       Last Recorded Vitals:  Vitals:    03/14/24 1110   BP: 140/86   BP Location: Left arm   Pulse: 65   Weight: 79.4 kg (175 lb)   Height: 1.702 m (5' 7\")       Past Surgical History:  He has a past surgical history that includes Hernia repair (03/20/2018); Other surgical history (12/03/2019); and Coronary angioplasty (04/17/2018).      Social History:  He reports that he has been smoking cigarettes. He has been smoking an average of .5 packs per day. He has never used smokeless tobacco. He reports current alcohol use. He reports that he does not use drugs.    Family History:  Family History   Problem Relation Name Age of Onset    Heart disease Mother      Heart attack Mother      Lung cancer Mother      Throat cancer Father          Allergies:  Lisinopril and Penicillins    Outpatient Medications:  Current Outpatient Medications   Medication Instructions    albuterol 2.5 mg /3 mL (0.083 %) nebulizer solution inhalation, Every 4 hours PRN    albuterol 90 mcg/actuation inhaler 2 puffs, inhalation, Every 4 hours PRN, Proair inhaler "    amLODIPine (NORVASC) 10 mg, oral, Daily    aspirin 81 mg EC tablet 1 tablet, oral, Daily    atorvastatin (Lipitor) 80 mg tablet 1 tablet, oral, Nightly    cholecalciferol (Vitamin D-3) 125 MCG (5000 UT) capsule 1 capsule, oral, Daily    cilostazol (PLETAL) 100 mg, oral, 2 times daily    clopidogrel (PLAVIX) 75 mg, oral, Daily    cyanocobalamin, vitamin B-12, 1,000 mcg tablet, sublingual 1 tablet, sublingual, Daily    cyclobenzaprine (FLEXERIL) 10 mg, oral, Nightly PRN    diclofenac sodium 2 g, Daily RT, APPLY TO UPPER EXTREMITIES, 2 GM OF GEL TO AFFECTED AREA 41 TIMES DAILY- DO NOT APPLY MORE THAN 8 GM DAILY    ipratropium-albuteroL (Duo-Neb) 0.5-2.5 mg/3 mL nebulizer solution 3 mL, inhalation, 4 times daily RT    isosorbide mononitrate ER (Imdur) 30 mg 24 hr tablet TAKE ONE TABLET BY MOUTH EVERY MORNING    losartan (COZAAR) 50 mg, oral, Every 12 hours    metoprolol tartrate (LOPRESSOR) 50 mg, oral, Every 12 hours    mometasone (Elocon) 0.1 % cream APPLY TO AFFECTED AREA(S)    mometasone (Elocon) 0.1 % ointment APPLY TO AFFECTED AREA(S) ONCE DAILY AS DIRECTED    mupirocin (Bactroban) 2 % ointment Topical, 3 times daily, Apply to affected area 3 times daily.    mupirocin (Bactroban) 2 % ointment Topical    Symbicort 160-4.5 mcg/actuation inhaler 2 puffs, inhalation, 2 times daily    traZODone (DESYREL) 100 mg, oral, Nightly     Review of Systems   Cardiovascular:  Positive for claudication (intermittent) and leg swelling (intermittent).   All other systems reviewed and are negative.     Physical Exam:  Constitutional:       Appearance: Healthy appearance. Not in distress.   Neck:      Vascular: No JVR. JVD normal.   Pulmonary:      Effort: Pulmonary effort is normal.      Breath sounds: Normal breath sounds. No wheezing. No rhonchi. No rales.   Chest:      Chest wall: Not tender to palpatation.   Cardiovascular:      PMI at left midclavicular line. Normal rate. Regular rhythm. Normal S1. Normal S2.       Murmurs:  There is no murmur.      No gallop.  No click. No rub.   Pulses:     Intact distal pulses.   Edema:     Peripheral edema absent.   Abdominal:      General: Bowel sounds are normal.      Palpations: Abdomen is soft.      Tenderness: There is no abdominal tenderness.   Musculoskeletal: Normal range of motion.         General: No tenderness. Skin:     General: Skin is warm and dry.   Neurological:      General: No focal deficit present.      Mental Status: Alert and oriented to person, place and time.          Last Labs:  CBC -  Lab Results   Component Value Date    WBC 10.6 10/05/2023    HGB 15.4 10/05/2023    HCT 46.8 10/05/2023     (H) 10/05/2023     10/05/2023       CMP -  Lab Results   Component Value Date    CALCIUM 9.2 12/30/2022    PHOS 2.7 04/06/2018    PROT 6.7 12/30/2022    ALBUMIN 3.9 12/30/2022    AST 14 12/30/2022    ALT 13 12/30/2022    ALKPHOS 94 12/30/2022    BILITOT 0.4 12/30/2022       LIPID PANEL -   Lab Results   Component Value Date    CHOL 120 12/30/2022    TRIG 167 (H) 12/30/2022    HDL 40.5 12/30/2022    CHHDL 3.0 12/30/2022    LDLF 46 12/30/2022    VLDL 33 12/30/2022       RENAL FUNCTION PANEL -   Lab Results   Component Value Date    GLUCOSE 86 12/30/2022     12/30/2022    K 4.6 12/30/2022     12/30/2022    CO2 26 12/30/2022    ANIONGAP 10 12/30/2022    BUN 9 12/30/2022    CREATININE 0.99 12/30/2022    GFRMALE 86 12/30/2022    CALCIUM 9.2 12/30/2022    PHOS 2.7 04/06/2018    ALBUMIN 3.9 12/30/2022        Lab Results   Component Value Date    HGBA1C 4.9 04/04/2018       Last Cardiology Tests:  10/20/2022 - Vascular Lab [facility]  1. Right Lower PVR: No evidence of arterial occlusive disease in the right lower extremity at rest. Normal digital perfusion noted. Biphasic flow is noted in the right posterior tibial artery, right dorsalis pedis artery and right common femoral artery.  2. Left Lower PVR: No evidence of arterial occlusive disease in the left lower extremity at  rest. Normal digital perfusion noted. Biphasic flow is noted in the left posterior tibial artery and left dorsalis pedis artery. Triphasic flow is noted in the left common femoral artery.    03/08/2022 - Vascular Lab PVR [facility] Only   1. Bilateral Lower PVR: No evidence of arterial occlusive disease bilaterally in the lower extremities at rest.  2. Right Lower PVR: Normal digital perfusion noted. Biphasic flow is noted in the right posterior tibial artery and right dorsalis pedis artery. Triphasic flow is noted in the right common femoral artery.  3. Left Lower PVR: Normal digital perfusion noted. Biphasic flow is noted in the left dorsalis pedis artery. Triphasic flow is noted in the left common femoral artery and left posterior tibial artery.     03/08/2022 - TTE  The left ventricular systolic function is normal with a 60% estimated ejection fraction.     03/02/2021 - Vascular Lab - PVR [facility] Only  1. Bilateral Lower PVR: No evidence of arterial occlusive disease bilaterally in the lower extremities at rest.  2. Right Lower PVR: Normal digital perfusion noted. Biphasic flow is noted in the right posterior tibial artery and right dorsalis pedis artery. Triphasic flow is noted in the right common femoral artery and right popliteal artery.  3. Left Lower PVR: Normal digital perfusion noted. Biphasic flow is noted in the left dorsalis pedis artery. Triphasic flow is noted in the left common femoral artery, left popliteal artery and left posterior tibial artery.     10/14/2019 Cardiac Catheterization  1. Mild non-obstructive coronary artery disease.  2. Elevated LV filling pressures.  3. Left Ventricular end-diastolic pressure = 23.     10/8/2019 - Stress Test  1. The resting ejection fraction was estimated at 40 to 45% with a peak exercise ejection fraction estimated at 40 to 45%.  2. Mildly decreased global left ventricular systolic function.  3. Stage: Rest: Multiple segmental abnormalities exist. See findings. Stage:  Impost: Multiple segmental abnormalities exist. See findings.  4. Abnormal stress echocardiogram with RCA territory scar and stress-induced ischemia with a generalized hypokinesis.  5. At peak, there are stress-induced regional wall motion abnormalities.  6. Mildly to moderately decreased peak stress global LV systolic function.  7. No ischemia by ECG at submax workload.  8. The inadequate level of stress was achieved.     11/13/2018 - Cardiac Catheterization  1. Severe atherosclerotic disease of left SFA.  2. Successful atherectomy, PTA of left SFA.     05/01/2018 - Stress Test  1. Nondiagnostic graded exercise treadmill stress test secondary to submaximal heart rate.  2. Baig treadmill score is not applicable for this patient.      04/05/2018 - Echo  1. The left ventricular systolic function is low normal with a 50% estimated ejection fraction.  2. Spectral Doppler shows an impaired relaxation pattern of left ventricular diastolic filling.  3. UEA not used. Previously noted wall motion abnormality not evident on this study.     04/04/2018 - Cardiac Catheterization  1. Single vessel coronary artery disease without proximal left anterior descending involvement.  2. Culprit vessel(s): right coronary artery.  3. S/p orbital atherectomy of proximal RCA.  4. S/p PCI of distal RCA with GERRY x 2.  5. S/p PCI of proximal RCA with GERRY x 1.     03/28/2018 - Cardiac Catheterization  1. Severe single vessel disease in the mid RCA, consisting of a heavily calcified 60-80% stenosis.  2. Moderate disease in the PDA.  3. Left ventricular end-diastolic pressure = 8.      03/20/2018 - Stress Test  1. Abnormal exercise stress echocardiogram due to right coronary artery ischemia.  2. Hypertensive blood pressure response may reduce spasticity of the exam.  3. 79% max predicted heart rate in 7 METs.   4. Blood pressure with a hypertensive response.  5. ECG findings were nondiagnostic.  6. Test ended due to patient's fatigue.  7. Stress  provoked an increase in chest tightness from 3/10 to 4/10.       Assessment/Plan   1) PVD S/P PTA left SFA in 2018  Continue Plavix 75 mg daily, Pletal 100 mg BID  [facility] 10/2022 with no evidence of arterial occlusive disease bilaterally   Reports intermittent claudication and LE edema  Wears diabetic socks  Continues to smoke - complete smoking cessation strongly encouraged  Check [facility]    2) CAD s/p PCI of RCA (Complex Intervention with Rotational Atherectomy) in 2018  Continue aspirin 81 mg daily, Plavix 75 mg daily, Imdur 30 mg daily, metoprolol tartrate 50 mg BID, amlodipine 10 mg daily, losartan 50 mg BID, atorvastatin 80 mg daily.  LHC in 2019 with patent stents  TTE March 2022 with LVEF 55%  Denies CP, chest discomfort or SOB  BP stable  Check CBC, CMP, Lipid Panel, A1c     3) Smoking  Discussed smoking cessation face to face and explained detrimental effects to the patients health including worsening lung disease, HTN and CAD. Educated on benefits on quitting smoking including reducing risk of CAD, lung disease, peptic ulcers, cancer and osteoporosis. Educated on available options to help in quitting including nicotine patch, Nicorette gum/lozenges, Chantix and Wellbutrin. Counseling time spent on smoking cessation was 5 minutes.   -Failed Chantix, Wellbutrin and patches.   -Reduced quantity     4) Dyslipidemia  Continue atorvastatin 80 mg daily   Goal LDL < 70  Lipid panel 12/30/2022 with LDL of 46; at goal  Check lipid panel      Scribe Attestation  By signing my name below, I, Xochitl Monique, Scribe   attest that this documentation has been prepared under the direction and in the presence of Bro Syed MD.

## 2024-03-14 ENCOUNTER — OFFICE VISIT (OUTPATIENT)
Dept: CARDIOLOGY | Facility: CLINIC | Age: 64
End: 2024-03-14
Payer: COMMERCIAL

## 2024-03-14 VITALS
WEIGHT: 175 LBS | HEART RATE: 65 BPM | BODY MASS INDEX: 27.47 KG/M2 | HEIGHT: 67 IN | DIASTOLIC BLOOD PRESSURE: 86 MMHG | SYSTOLIC BLOOD PRESSURE: 140 MMHG

## 2024-03-14 DIAGNOSIS — I73.9 CLAUDICATION, INTERMITTENT (CMS-HCC): ICD-10-CM

## 2024-03-14 DIAGNOSIS — I25.118 CORONARY ARTERY DISEASE OF NATIVE ARTERY OF NATIVE HEART WITH STABLE ANGINA PECTORIS (CMS-HCC): Primary | ICD-10-CM

## 2024-03-14 DIAGNOSIS — I73.9 PAD (PERIPHERAL ARTERY DISEASE) (CMS-HCC): ICD-10-CM

## 2024-03-14 DIAGNOSIS — R73.9 HYPERGLYCEMIA: ICD-10-CM

## 2024-03-14 PROCEDURE — 99213 OFFICE O/P EST LOW 20 MIN: CPT | Performed by: INTERNAL MEDICINE

## 2024-03-14 PROCEDURE — 3079F DIAST BP 80-89 MM HG: CPT | Performed by: INTERNAL MEDICINE

## 2024-03-14 PROCEDURE — 3077F SYST BP >= 140 MM HG: CPT | Performed by: INTERNAL MEDICINE

## 2024-03-14 PROCEDURE — 93000 ELECTROCARDIOGRAM COMPLETE: CPT | Performed by: INTERNAL MEDICINE

## 2024-03-14 ASSESSMENT — ENCOUNTER SYMPTOMS
DEPRESSION: 0
CLAUDICATION: 1
OCCASIONAL FEELINGS OF UNSTEADINESS: 0
LOSS OF SENSATION IN FEET: 1

## 2024-03-14 NOTE — PATIENT INSTRUCTIONS
Continue all current medications as prescribed.   Please have blood work drawn at your earliest convenience.  Dr. Syed has ordered a pressure measurement ultrasound of your legs.   Complete smoking cessation is strongly encouraged.   Followup with Dr. Syed after the above ultrasound.    If you have any questions or cardiac concerns, please call our office at [phone].

## 2024-03-14 NOTE — LETTER
"March 14, 2024     Link Fajardo DO  [address]  [facility] [address]    Patient: robbin Basurto   YOB: 1960   Date of Visit: 3/14/2024       Dear Dr. Link Fajardo DO:    Thank you for referring robbin Basurto to me for evaluation. Below are my notes for this consultation.  If you have questions, please do not hesitate to call me. I look forward to following your patient along with you.       Sincerely,     Bro Syed MD      CC: No Recipients  ______________________________________________________________________________________    Counseling:  The patient was counseled regarding diagnostic results, instructions for management, risk factor reductions, prognosis, patient and family education, impressions, risks and benefits of treatment options and importance of compliance with treatment.      Chief Complaint:   The patient presents today for 6-month followup of PVD, CAD and dyslipidemia.      History Of Present Illness:    Yadiel Basurto is a 63 year old male patient who presents today for 6-month followup of PVD, CAD and dyslipidemia. His PMH is significant for HTN, COPD, CAD, PVD s/p PTA of left SFA in 2018 and dyslipidemia. Over the past 6 months, the patient states that he has done well from a cardiac standpoint. He denies any CP, chest discomfort or SOB. He reports intermittent claudication and LE edema for which he wears diabetic socks. Unfortunately, the patient continues to smoke, although at reduced quantity.       Last Recorded Vitals:  Vitals:    03/14/24 1110   BP: 140/86   BP Location: Left arm   Pulse: 65   Weight: 79.4 kg (175 lb)   Height: 1.702 m (5' 7\")       Past Surgical History:  He has a past surgical history that includes Hernia repair (03/20/2018); Other surgical history (12/03/2019); and Coronary angioplasty (04/17/2018).      Social History:  He reports that he has been smoking cigarettes. He has been smoking an average of .5 " packs per day. He has never used smokeless tobacco. He reports current alcohol use. He reports that he does not use drugs.    Family History:  Family History   Problem Relation Name Age of Onset   • Heart disease Mother     • Heart attack Mother     • Lung cancer Mother     • Throat cancer Father          Allergies:  Lisinopril and Penicillins    Outpatient Medications:  Current Outpatient Medications   Medication Instructions   • albuterol 2.5 mg /3 mL (0.083 %) nebulizer solution inhalation, Every 4 hours PRN   • albuterol 90 mcg/actuation inhaler 2 puffs, inhalation, Every 4 hours PRN, Proair inhaler   • amLODIPine (NORVASC) 10 mg, oral, Daily   • aspirin 81 mg EC tablet 1 tablet, oral, Daily   • atorvastatin (Lipitor) 80 mg tablet 1 tablet, oral, Nightly   • cholecalciferol (Vitamin D-3) 125 MCG (5000 UT) capsule 1 capsule, oral, Daily   • cilostazol (PLETAL) 100 mg, oral, 2 times daily   • clopidogrel (PLAVIX) 75 mg, oral, Daily   • cyanocobalamin, vitamin B-12, 1,000 mcg tablet, sublingual 1 tablet, sublingual, Daily   • cyclobenzaprine (FLEXERIL) 10 mg, oral, Nightly PRN   • diclofenac sodium 2 g, Daily RT, APPLY TO UPPER EXTREMITIES, 2 GM OF GEL TO AFFECTED AREA 41 TIMES DAILY- DO NOT APPLY MORE THAN 8 GM DAILY   • ipratropium-albuteroL (Duo-Neb) 0.5-2.5 mg/3 mL nebulizer solution 3 mL, inhalation, 4 times daily RT   • isosorbide mononitrate ER (Imdur) 30 mg 24 hr tablet TAKE ONE TABLET BY MOUTH EVERY MORNING   • losartan (COZAAR) 50 mg, oral, Every 12 hours   • metoprolol tartrate (LOPRESSOR) 50 mg, oral, Every 12 hours   • mometasone (Elocon) 0.1 % cream APPLY TO AFFECTED AREA(S)   • mometasone (Elocon) 0.1 % ointment APPLY TO AFFECTED AREA(S) ONCE DAILY AS DIRECTED   • mupirocin (Bactroban) 2 % ointment Topical, 3 times daily, Apply to affected area 3 times daily.   • mupirocin (Bactroban) 2 % ointment Topical   • Symbicort 160-4.5 mcg/actuation inhaler 2 puffs, inhalation, 2 times daily   • traZODone  (DESYREL) 100 mg, oral, Nightly     Review of Systems   Cardiovascular:  Positive for claudication (intermittent) and leg swelling (intermittent).   All other systems reviewed and are negative.     Physical Exam:  Constitutional:       Appearance: Healthy appearance. Not in distress.   Neck:      Vascular: No JVR. JVD normal.   Pulmonary:      Effort: Pulmonary effort is normal.      Breath sounds: Normal breath sounds. No wheezing. No rhonchi. No rales.   Chest:      Chest wall: Not tender to palpatation.   Cardiovascular:      PMI at left midclavicular line. Normal rate. Regular rhythm. Normal S1. Normal S2.       Murmurs: There is no murmur.      No gallop.  No click. No rub.   Pulses:     Intact distal pulses.   Edema:     Peripheral edema absent.   Abdominal:      General: Bowel sounds are normal.      Palpations: Abdomen is soft.      Tenderness: There is no abdominal tenderness.   Musculoskeletal: Normal range of motion.         General: No tenderness. Skin:     General: Skin is warm and dry.   Neurological:      General: No focal deficit present.      Mental Status: Alert and oriented to person, place and time.          Last Labs:  CBC -  Lab Results   Component Value Date    WBC 10.6 10/05/2023    HGB 15.4 10/05/2023    HCT 46.8 10/05/2023     (H) 10/05/2023     10/05/2023       CMP -  Lab Results   Component Value Date    CALCIUM 9.2 12/30/2022    PHOS 2.7 04/06/2018    PROT 6.7 12/30/2022    ALBUMIN 3.9 12/30/2022    AST 14 12/30/2022    ALT 13 12/30/2022    ALKPHOS 94 12/30/2022    BILITOT 0.4 12/30/2022       LIPID PANEL -   Lab Results   Component Value Date    CHOL 120 12/30/2022    TRIG 167 (H) 12/30/2022    HDL 40.5 12/30/2022    CHHDL 3.0 12/30/2022    LDLF 46 12/30/2022    VLDL 33 12/30/2022       RENAL FUNCTION PANEL -   Lab Results   Component Value Date    GLUCOSE 86 12/30/2022     12/30/2022    K 4.6 12/30/2022     12/30/2022    CO2 26 12/30/2022    ANIONGAP 10  12/30/2022    BUN 9 12/30/2022    CREATININE 0.99 12/30/2022    GFRMALE 86 12/30/2022    CALCIUM 9.2 12/30/2022    PHOS 2.7 04/06/2018    ALBUMIN 3.9 12/30/2022        Lab Results   Component Value Date    HGBA1C 4.9 04/04/2018       Last Cardiology Tests:  10/20/2022 - Vascular Lab [facility]  1. Right Lower PVR: No evidence of arterial occlusive disease in the right lower extremity at rest. Normal digital perfusion noted. Biphasic flow is noted in the right posterior tibial artery, right dorsalis pedis artery and right common femoral artery.  2. Left Lower PVR: No evidence of arterial occlusive disease in the left lower extremity at rest. Normal digital perfusion noted. Biphasic flow is noted in the left posterior tibial artery and left dorsalis pedis artery. Triphasic flow is noted in the left common femoral artery.    03/08/2022 - Vascular Lab PVR [facility] Only   1. Bilateral Lower PVR: No evidence of arterial occlusive disease bilaterally in the lower extremities at rest.  2. Right Lower PVR: Normal digital perfusion noted. Biphasic flow is noted in the right posterior tibial artery and right dorsalis pedis artery. Triphasic flow is noted in the right common femoral artery.  3. Left Lower PVR: Normal digital perfusion noted. Biphasic flow is noted in the left dorsalis pedis artery. Triphasic flow is noted in the left common femoral artery and left posterior tibial artery.     03/08/2022 - TTE  The left ventricular systolic function is normal with a 60% estimated ejection fraction.     03/02/2021 - Vascular Lab - PVR [facility] Only  1. Bilateral Lower PVR: No evidence of arterial occlusive disease bilaterally in the lower extremities at rest.  2. Right Lower PVR: Normal digital perfusion noted. Biphasic flow is noted in the right posterior tibial artery and right dorsalis pedis artery. Triphasic flow is noted in the right common femoral artery and right popliteal artery.  3. Left Lower PVR: Normal digital perfusion noted.  Biphasic flow is noted in the left dorsalis pedis artery. Triphasic flow is noted in the left common femoral artery, left popliteal artery and left posterior tibial artery.     10/14/2019 Cardiac Catheterization  1. Mild non-obstructive coronary artery disease.  2. Elevated LV filling pressures.  3. Left Ventricular end-diastolic pressure = 23.     10/8/2019 - Stress Test  1. The resting ejection fraction was estimated at 40 to 45% with a peak exercise ejection fraction estimated at 40 to 45%.  2. Mildly decreased global left ventricular systolic function.  3. Stage: Rest: Multiple segmental abnormalities exist. See findings. Stage: Impost: Multiple segmental abnormalities exist. See findings.  4. Abnormal stress echocardiogram with RCA territory scar and stress-induced ischemia with a generalized hypokinesis.  5. At peak, there are stress-induced regional wall motion abnormalities.  6. Mildly to moderately decreased peak stress global LV systolic function.  7. No ischemia by ECG at submax workload.  8. The inadequate level of stress was achieved.     11/13/2018 - Cardiac Catheterization  1. Severe atherosclerotic disease of left SFA.  2. Successful atherectomy, PTA of left SFA.     05/01/2018 - Stress Test  1. Nondiagnostic graded exercise treadmill stress test secondary to submaximal heart rate.  2. Baig treadmill score is not applicable for this patient.      04/05/2018 - Echo  1. The left ventricular systolic function is low normal with a 50% estimated ejection fraction.  2. Spectral Doppler shows an impaired relaxation pattern of left ventricular diastolic filling.  3. UEA not used. Previously noted wall motion abnormality not evident on this study.     04/04/2018 - Cardiac Catheterization  1. Single vessel coronary artery disease without proximal left anterior descending involvement.  2. Culprit vessel(s): right coronary artery.  3. S/p orbital atherectomy of proximal RCA.  4. S/p PCI of distal RCA with GERRY x  2.  5. S/p PCI of proximal RCA with GERRY x 1.     03/28/2018 - Cardiac Catheterization  1. Severe single vessel disease in the mid RCA, consisting of a heavily calcified 60-80% stenosis.  2. Moderate disease in the PDA.  3. Left ventricular end-diastolic pressure = 8.      03/20/2018 - Stress Test  1. Abnormal exercise stress echocardiogram due to right coronary artery ischemia.  2. Hypertensive blood pressure response may reduce spasticity of the exam.  3. 79% max predicted heart rate in 7 METs.   4. Blood pressure with a hypertensive response.  5. ECG findings were nondiagnostic.  6. Test ended due to patient's fatigue.  7. Stress provoked an increase in chest tightness from 3/10 to 4/10.       Assessment/Plan  1) PVD S/P PTA left SFA in 2018  Continue Plavix 75 mg daily, Pletal 100 mg BID  [facility] 10/2022 with no evidence of arterial occlusive disease bilaterally   Reports intermittent claudication and LE edema  Wears diabetic socks  Continues to smoke - complete smoking cessation strongly encouraged  Check [facility]    2) CAD s/p PCI of RCA (Complex Intervention with Rotational Atherectomy) in 2018  Continue aspirin 81 mg daily, Plavix 75 mg daily, Imdur 30 mg daily, metoprolol tartrate 50 mg BID, amlodipine 10 mg daily, losartan 50 mg BID, atorvastatin 80 mg daily.  [facility] in 2019 with patent stents  TTE March 2022 with LVEF 55%  Denies CP, chest discomfort or SOB  BP stable  Check CBC, CMP, Lipid Panel, A1c     3) Smoking  Discussed smoking cessation face to face and explained detrimental effects to the patients health including worsening lung disease, HTN and CAD. Educated on benefits on quitting smoking including reducing risk of CAD, lung disease, peptic ulcers, cancer and osteoporosis. Educated on available options to help in quitting including nicotine patch, Nicorette gum/lozenges, Chantix and Wellbutrin. Counseling time spent on smoking cessation was 5 minutes.   -Failed Chantix, Wellbutrin and patches.   -Reduced  quantity     4) Dyslipidemia  Continue atorvastatin 80 mg daily   Goal LDL < 70  Lipid panel 12/30/2022 with LDL of 46; at goal  Check lipid panel      Scribe Attestation  By signing my name below, I, Lana Chambers   attest that this documentation has been prepared under the direction and in the presence of Bro Syed MD.

## 2024-03-25 ENCOUNTER — LAB (OUTPATIENT)
Dept: LAB | Facility: LAB | Age: 64
End: 2024-03-25
Payer: COMMERCIAL

## 2024-03-25 DIAGNOSIS — F10.20 UNCOMPLICATED ALCOHOL DEPENDENCE (MULTI): ICD-10-CM

## 2024-03-25 DIAGNOSIS — I73.9 CLAUDICATION, INTERMITTENT (CMS-HCC): ICD-10-CM

## 2024-03-25 DIAGNOSIS — J44.89 COPD (CHRONIC OBSTRUCTIVE PULMONARY DISEASE) WITH CHRONIC BRONCHITIS (MULTI): ICD-10-CM

## 2024-03-25 DIAGNOSIS — R73.9 HYPERGLYCEMIA: ICD-10-CM

## 2024-03-25 DIAGNOSIS — I73.9 PAD (PERIPHERAL ARTERY DISEASE) (CMS-HCC): ICD-10-CM

## 2024-03-25 DIAGNOSIS — F17.200 NEEDS SMOKING CESSATION EDUCATION: ICD-10-CM

## 2024-03-25 DIAGNOSIS — I10 BENIGN ESSENTIAL HYPERTENSION: ICD-10-CM

## 2024-03-25 DIAGNOSIS — I70.222 ATHEROSCLEROSIS OF NATIVE ARTERY OF LEFT LOWER EXTREMITY WITH REST PAIN (MULTI): ICD-10-CM

## 2024-03-25 DIAGNOSIS — Z23 FLU VACCINE NEED: ICD-10-CM

## 2024-03-25 DIAGNOSIS — I25.118 CORONARY ARTERY DISEASE OF NATIVE ARTERY OF NATIVE HEART WITH STABLE ANGINA PECTORIS (CMS-HCC): ICD-10-CM

## 2024-03-25 DIAGNOSIS — E78.5 DYSLIPIDEMIA, GOAL LDL BELOW 70: ICD-10-CM

## 2024-03-25 LAB
ALBUMIN SERPL BCP-MCNC: 4.2 G/DL (ref 3.4–5)
ALP SERPL-CCNC: 110 U/L (ref 33–136)
ALT SERPL W P-5'-P-CCNC: 15 U/L (ref 10–52)
ANION GAP SERPL CALC-SCNC: 14 MMOL/L (ref 10–20)
AST SERPL W P-5'-P-CCNC: 13 U/L (ref 9–39)
BASOPHILS # BLD AUTO: 0.05 X10*3/UL (ref 0–0.1)
BASOPHILS NFR BLD AUTO: 0.4 %
BILIRUB SERPL-MCNC: 0.5 MG/DL (ref 0–1.2)
BUN SERPL-MCNC: 11 MG/DL (ref 6–23)
CALCIUM SERPL-MCNC: 9.1 MG/DL (ref 8.6–10.3)
CHLORIDE SERPL-SCNC: 103 MMOL/L (ref 98–107)
CHOLEST SERPL-MCNC: 137 MG/DL (ref 0–199)
CHOLESTEROL/HDL RATIO: 3
CO2 SERPL-SCNC: 25 MMOL/L (ref 21–32)
CREAT SERPL-MCNC: 0.95 MG/DL (ref 0.5–1.3)
EGFRCR SERPLBLD CKD-EPI 2021: 90 ML/MIN/1.73M*2
EOSINOPHIL # BLD AUTO: 0.09 X10*3/UL (ref 0–0.7)
EOSINOPHIL NFR BLD AUTO: 0.7 %
ERYTHROCYTE [DISTWIDTH] IN BLOOD BY AUTOMATED COUNT: 14.6 % (ref 11.5–14.5)
GLUCOSE SERPL-MCNC: 80 MG/DL (ref 74–99)
HCT VFR BLD AUTO: 44.3 % (ref 41–52)
HDLC SERPL-MCNC: 46.3 MG/DL
HGB BLD-MCNC: 15.2 G/DL (ref 13.5–17.5)
IMM GRANULOCYTES # BLD AUTO: 0.08 X10*3/UL (ref 0–0.7)
IMM GRANULOCYTES NFR BLD AUTO: 0.6 % (ref 0–0.9)
LDLC SERPL CALC-MCNC: 66 MG/DL
LYMPHOCYTES # BLD AUTO: 1.72 X10*3/UL (ref 1.2–4.8)
LYMPHOCYTES NFR BLD AUTO: 13.4 %
MCH RBC QN AUTO: 34.9 PG (ref 26–34)
MCHC RBC AUTO-ENTMCNC: 34.3 G/DL (ref 32–36)
MCV RBC AUTO: 102 FL (ref 80–100)
MONOCYTES # BLD AUTO: 0.91 X10*3/UL (ref 0.1–1)
MONOCYTES NFR BLD AUTO: 7.1 %
NEUTROPHILS # BLD AUTO: 9.94 X10*3/UL (ref 1.2–7.7)
NEUTROPHILS NFR BLD AUTO: 77.8 %
NON HDL CHOLESTEROL: 91 MG/DL (ref 0–149)
NRBC BLD-RTO: 0 /100 WBCS (ref 0–0)
PLATELET # BLD AUTO: 311 X10*3/UL (ref 150–450)
POTASSIUM SERPL-SCNC: 4.6 MMOL/L (ref 3.5–5.3)
PROT SERPL-MCNC: 6.7 G/DL (ref 6.4–8.2)
PSA SERPL-MCNC: 0.59 NG/ML
RBC # BLD AUTO: 4.36 X10*6/UL (ref 4.5–5.9)
SODIUM SERPL-SCNC: 137 MMOL/L (ref 136–145)
TRIGL SERPL-MCNC: 123 MG/DL (ref 0–149)
VLDL: 25 MG/DL (ref 0–40)
WBC # BLD AUTO: 12.8 X10*3/UL (ref 4.4–11.3)

## 2024-03-25 PROCEDURE — 80053 COMPREHEN METABOLIC PANEL: CPT

## 2024-03-25 PROCEDURE — 80061 LIPID PANEL: CPT

## 2024-03-25 PROCEDURE — 83036 HEMOGLOBIN GLYCOSYLATED A1C: CPT

## 2024-03-25 PROCEDURE — 84153 ASSAY OF PSA TOTAL: CPT

## 2024-03-25 PROCEDURE — 36415 COLL VENOUS BLD VENIPUNCTURE: CPT

## 2024-03-25 PROCEDURE — 85025 COMPLETE CBC W/AUTO DIFF WBC: CPT

## 2024-03-26 LAB
EST. AVERAGE GLUCOSE BLD GHB EST-MCNC: 126 MG/DL
HBA1C MFR BLD: 6 %

## 2024-03-27 ENCOUNTER — OFFICE VISIT (OUTPATIENT)
Dept: PRIMARY CARE | Facility: CLINIC | Age: 64
End: 2024-03-27
Payer: MEDICARE

## 2024-03-27 VITALS
WEIGHT: 176 LBS | HEIGHT: 67 IN | BODY MASS INDEX: 27.62 KG/M2 | SYSTOLIC BLOOD PRESSURE: 130 MMHG | HEART RATE: 68 BPM | DIASTOLIC BLOOD PRESSURE: 80 MMHG

## 2024-03-27 DIAGNOSIS — Z12.2 ENCOUNTER FOR SCREENING FOR MALIGNANT NEOPLASM OF LUNG IN CURRENT SMOKER WITH 30 PACK YEAR HISTORY OR GREATER: ICD-10-CM

## 2024-03-27 DIAGNOSIS — Z00.00 MEDICARE ANNUAL WELLNESS VISIT, SUBSEQUENT: Primary | ICD-10-CM

## 2024-03-27 DIAGNOSIS — J01.01 ACUTE RECURRENT MAXILLARY SINUSITIS: ICD-10-CM

## 2024-03-27 DIAGNOSIS — E78.5 DYSLIPIDEMIA, GOAL LDL BELOW 70: ICD-10-CM

## 2024-03-27 DIAGNOSIS — F17.210 SMOKING GREATER THAN 40 PACK YEARS: ICD-10-CM

## 2024-03-27 DIAGNOSIS — I73.9 PAD (PERIPHERAL ARTERY DISEASE) (CMS-HCC): ICD-10-CM

## 2024-03-27 DIAGNOSIS — I25.118 CORONARY ARTERY DISEASE OF NATIVE ARTERY OF NATIVE HEART WITH STABLE ANGINA PECTORIS (CMS-HCC): ICD-10-CM

## 2024-03-27 DIAGNOSIS — F17.200 ENCOUNTER FOR SCREENING FOR MALIGNANT NEOPLASM OF LUNG IN CURRENT SMOKER WITH 30 PACK YEAR HISTORY OR GREATER: ICD-10-CM

## 2024-03-27 DIAGNOSIS — Z12.11 COLON CANCER SCREENING: ICD-10-CM

## 2024-03-27 DIAGNOSIS — I11.9 HYPERTENSIVE HEART DISEASE WITHOUT HEART FAILURE: ICD-10-CM

## 2024-03-27 DIAGNOSIS — F10.20 UNCOMPLICATED ALCOHOL DEPENDENCE (MULTI): ICD-10-CM

## 2024-03-27 DIAGNOSIS — R73.02 IGT (IMPAIRED GLUCOSE TOLERANCE): ICD-10-CM

## 2024-03-27 DIAGNOSIS — J44.89 COPD (CHRONIC OBSTRUCTIVE PULMONARY DISEASE) WITH CHRONIC BRONCHITIS (MULTI): ICD-10-CM

## 2024-03-27 PROBLEM — Z23 FLU VACCINE NEED: Status: RESOLVED | Noted: 2023-10-06 | Resolved: 2024-03-27

## 2024-03-27 PROBLEM — J41.1 MUCOPURULENT CHRONIC BRONCHITIS (MULTI): Status: RESOLVED | Noted: 2024-03-27 | Resolved: 2024-03-27

## 2024-03-27 PROBLEM — J41.1 MUCOPURULENT CHRONIC BRONCHITIS (MULTI): Status: ACTIVE | Noted: 2024-03-27

## 2024-03-27 PROCEDURE — 3075F SYST BP GE 130 - 139MM HG: CPT | Performed by: INTERNAL MEDICINE

## 2024-03-27 PROCEDURE — G0444 DEPRESSION SCREEN ANNUAL: HCPCS | Performed by: INTERNAL MEDICINE

## 2024-03-27 PROCEDURE — 3079F DIAST BP 80-89 MM HG: CPT | Performed by: INTERNAL MEDICINE

## 2024-03-27 PROCEDURE — 99497 ADVNCD CARE PLAN 30 MIN: CPT | Performed by: INTERNAL MEDICINE

## 2024-03-27 PROCEDURE — G0296 VISIT TO DETERM LDCT ELIG: HCPCS | Performed by: INTERNAL MEDICINE

## 2024-03-27 PROCEDURE — G0439 PPPS, SUBSEQ VISIT: HCPCS | Performed by: INTERNAL MEDICINE

## 2024-03-27 PROCEDURE — 99406 BEHAV CHNG SMOKING 3-10 MIN: CPT | Performed by: INTERNAL MEDICINE

## 2024-03-27 PROCEDURE — 99214 OFFICE O/P EST MOD 30 MIN: CPT | Performed by: INTERNAL MEDICINE

## 2024-03-27 PROCEDURE — G0446 INTENS BEHAVE THER CARDIO DX: HCPCS | Performed by: INTERNAL MEDICINE

## 2024-03-27 PROCEDURE — 99396 PREV VISIT EST AGE 40-64: CPT | Performed by: INTERNAL MEDICINE

## 2024-03-27 RX ORDER — TAMSULOSIN HYDROCHLORIDE 0.4 MG/1
0.4 CAPSULE ORAL DAILY
Qty: 30 CAPSULE | Refills: 11 | Status: SHIPPED | OUTPATIENT
Start: 2024-03-27 | End: 2025-03-27

## 2024-03-27 RX ORDER — CEFDINIR 300 MG/1
300 CAPSULE ORAL 2 TIMES DAILY
Qty: 20 CAPSULE | Refills: 0 | Status: SHIPPED | OUTPATIENT
Start: 2024-03-27 | End: 2024-04-06

## 2024-03-27 ASSESSMENT — PATIENT HEALTH QUESTIONNAIRE - PHQ9
1. LITTLE INTEREST OR PLEASURE IN DOING THINGS: NOT AT ALL
2. FEELING DOWN, DEPRESSED OR HOPELESS: NOT AT ALL
SUM OF ALL RESPONSES TO PHQ9 QUESTIONS 1 AND 2: 0

## 2024-03-27 ASSESSMENT — ACTIVITIES OF DAILY LIVING (ADL)
BATHING: INDEPENDENT
MANAGING_FINANCES: INDEPENDENT
DRESSING: INDEPENDENT
TAKING_MEDICATION: INDEPENDENT
DOING_HOUSEWORK: INDEPENDENT
GROCERY_SHOPPING: INDEPENDENT

## 2024-03-27 ASSESSMENT — ANXIETY QUESTIONNAIRES
IF YOU CHECKED OFF ANY PROBLEMS ON THIS QUESTIONNAIRE, HOW DIFFICULT HAVE THESE PROBLEMS MADE IT FOR YOU TO DO YOUR WORK, TAKE CARE OF THINGS AT HOME, OR GET ALONG WITH OTHER PEOPLE: NOT DIFFICULT AT ALL
3. WORRYING TOO MUCH ABOUT DIFFERENT THINGS: NOT AT ALL
6. BECOMING EASILY ANNOYED OR IRRITABLE: NOT AT ALL
GAD7 TOTAL SCORE: 0
1. FEELING NERVOUS, ANXIOUS, OR ON EDGE: NOT AT ALL
4. TROUBLE RELAXING: NOT AT ALL
2. NOT BEING ABLE TO STOP OR CONTROL WORRYING: NOT AT ALL
5. BEING SO RESTLESS THAT IT IS HARD TO SIT STILL: NOT AT ALL
7. FEELING AFRAID AS IF SOMETHING AWFUL MIGHT HAPPEN: NOT AT ALL

## 2024-03-27 ASSESSMENT — ENCOUNTER SYMPTOMS
LOSS OF SENSATION IN FEET: 0
DEPRESSION: 0
OCCASIONAL FEELINGS OF UNSTEADINESS: 0

## 2024-03-27 NOTE — PROGRESS NOTES
Subjective   Reason for Visit: Yadiel Basurto is an 63 y.o. male here for a Medicare Wellness visit.     Past Medical, Surgical, and Family History reviewed and updated in chart.    Reviewed all medications by prescribing practitioner or clinical pharmacist (such as prescriptions, OTCs, herbal therapies and supplements) and documented in the medical record.    HPI    Patient Care Team:  Link Fajardo DO as PCP - General  Link Fajardo DO as PCP - [employer] PCP     Review of Systems   All other systems reviewed and are negative.  Alcohol consumption becomes hazardous when consuming women [place] over 65 years old greater than 7 drinks per week or greater than 3 drinks per occasion for men greater than 14 drinks per week or greater than 4 drinks per occasion.  I spent 15 minutes screening for alcohol use.Depression and anxiety screening completed   PHQ9 score   GAD7 score   I spent 15 minutes obtaining and discussing depression screening using PHQ 2 questions with results documented in chart.  Screening using PHQ-9 and RON-7 scores were used for follow-up with treatment and referral plan discussed.      I spent more than 3 minutes but less than 10 minutes counseling patient about need for smoking/tobacco cessation and how I can get support efforts when patient is ready to quit.  Discussed nicotine replacement therapy such as bupropion nicotine replacement therapy and Chantix .  Hypnosis,support groups,and acupuncture are other potential options.I spent 15 minutes face-to-face with this individual discussing the cardiovascular risk and behavioral therapies of nutritional choices exercise and elimination of habits contributing to risk .We agreed on a plan how they may be able to reduce  current cardiovascular risk.  Per patient with calculation greater than 10% aspirin use was discussed and encouraged unless known allergy or increased risk of bleeding contraindicates use patient's 10-year CV risk  "estimate calculates:I spent greater than 15 minutes discussing advance care planning including the explanation and discussion of advanced directives.  If patient does not have current up-to-date documents examples and information provided on how to create both living will and power of .  toolkit was given to patient and was encouraged to work out completing these documents.    Objective   Vitals:  /80 (BP Location: Right arm, Patient Position: Sitting)   Pulse 68   Ht 1.702 m (5' 7\")   Wt 79.8 kg (176 lb)   BMI 27.57 kg/m²       Physical Exam  Vitals and nursing note reviewed.   Constitutional:       General: He is not in acute distress.     Appearance: Normal appearance. He is well-developed. He is not toxic-appearing.   HENT:      Head: Normocephalic and atraumatic.      Right Ear: Tympanic membrane and external ear normal.      Left Ear: Tympanic membrane and external ear normal.      Nose: Nose normal.      Mouth/Throat:      Mouth: Mucous membranes are moist.      Pharynx: Oropharynx is clear. No oropharyngeal exudate or posterior oropharyngeal erythema.      Tonsils: No tonsillar exudate. 2+ on the right. 2+ on the left.   Eyes:      Extraocular Movements: Extraocular movements intact.      Conjunctiva/sclera: Conjunctivae normal.   Cardiovascular:      Rate and Rhythm: Normal rate and regular rhythm.      Pulses: Normal pulses.      Heart sounds: Normal heart sounds. No murmur heard.  Pulmonary:      Effort: Pulmonary effort is normal.      Breath sounds: Normal breath sounds.   Abdominal:      General: Abdomen is flat. Bowel sounds are normal.      Palpations: Abdomen is soft.   Musculoskeletal:      Cervical back: Neck supple.   Feet:      Right foot:      Skin integrity: Skin integrity normal. No ulcer, blister, skin breakdown, erythema, warmth or callus.      Toenail Condition: Right toenails are normal.      Left foot:      Skin integrity: Skin integrity normal. No ulcer, blister, " skin breakdown, erythema, warmth or callus.      Toenail Condition: Left toenails are normal.   Lymphadenopathy:      Cervical: No cervical adenopathy.   Skin:     General: Skin is warm and dry.      Capillary Refill: Capillary refill takes more than 3 seconds.      Findings: No rash.   Neurological:      Mental Status: He is alert. Mental status is at baseline.      Sensory: Sensation is intact.   Psychiatric:         Mood and Affect: Mood normal.         Behavior: Behavior normal.         Thought Content: Thought content normal.         Judgment: Judgment normal.         Assessment/Plan   Problem List Items Addressed This Visit       Hypertensive heart disease without heart failure    Current Assessment & Plan     Stable well compensated continue metoprolol tartrate 50 mg twice a day amlodipine 10 mg daily losartan 50 mg twice a day isosorbide mononitrate 30 mg daily well compensated at this time         Coronary artery disease of native artery of native heart with stable angina pectoris (CMS/[facility])    Current Assessment & Plan     Stable denies anginal complaints at this time follows closely with cardiologyContinue metoprolol tartrate 50 mg every 12 hours and isosorbide mononitrate ER 30 mg daily         Relevant Medications    tamsulosin (Flomax) 0.4 mg 24 hr capsule    Other Relevant Orders    CT lung screening low dose    Comprehensive Metabolic Panel    Hemoglobin A1C    Lipid Panel    Albumin , Urine Random    Cologuard® colon cancer screening    Follow Up In [facility] - PCP - Established    PAD (peripheral artery disease) (CMS/[facility])    Overview     PVD s/p PTA of left SFA in 2018          Current Assessment & Plan     History of peripheral vascular disease does not report any ischemic pain at rest history of stent continue aspirin 81 mg daily clopidogrel 75 mg daily and cilostazol 100 mg twice a day encourage smoking cessation has cut down to half pack of cigarettes a day follows closely with  vascular surgery with annual PVR testing ABIs revealing patent arteries at this time continue aggressive therapy         Relevant Medications    tamsulosin (Flomax) 0.4 mg 24 hr capsule    Other Relevant Orders    CT lung screening low dose    Comprehensive Metabolic Panel    Hemoglobin A1C    Lipid Panel    Albumin , Urine Random    Cologuard® colon cancer screening    Follow Up In [facility] - PCP - Established    COPD (chronic obstructive pulmonary disease) with chronic bronchitis    Current Assessment & Plan     Continue Symbicort 160/4.52 puffs twice a day with DuoNebs at home work aggressively on smoking cessation         Relevant Medications    tamsulosin (Flomax) 0.4 mg 24 hr capsule    Other Relevant Orders    CT lung screening low dose    Comprehensive Metabolic Panel    Hemoglobin A1C    Lipid Panel    Albumin , Urine Random    Cologuard® colon cancer screening    Follow Up In [facility] - PCP - Established    Dyslipidemia, goal LDL below 70    Current Assessment & Plan     LDL cholesterol optimal at 66 with HDL cholesterol 46 and triglyceride levels of 123 continue atorvastatin 80 mg daily         Relevant Medications    tamsulosin (Flomax) 0.4 mg 24 hr capsule    Other Relevant Orders    CT lung screening low dose    Comprehensive Metabolic Panel    Hemoglobin A1C    Lipid Panel    Albumin , Urine Random    Cologuard® colon cancer screening    Follow Up In [facility] - PCP - Established    Smoking greater than 40 pack years    Current Assessment & Plan     Schedule lung cancer screening evaluation         Relevant Orders    CT lung screening low dose    Uncomplicated alcohol dependence (CMS/HCC)    Current Assessment & Plan     Patient has cut down to less than 2 beers a day         Medicare annual wellness visit, subsequent - Primary    Current Assessment & Plan     Up-to-date with vaccinations and screenings discussed advanced medical directives         Acute recurrent  maxillary sinusitis    Current Assessment & Plan     History of nasal polyps in addition to fluticasone nasal spray start cefdinir 300 mg 2 tablets daily for 10 days symptoms of thick nasal drainage and congestion going on for at least 2 weeks         Relevant Medications    cefdinir (Omnicef) 300 mg capsule    IGT (impaired glucose tolerance)    Relevant Medications    tamsulosin (Flomax) 0.4 mg 24 hr capsule    Other Relevant Orders    CT lung screening low dose    Comprehensive Metabolic Panel    Hemoglobin A1C    Lipid Panel    Albumin , Urine Random    Cologuard® colon cancer screening    Follow Up In [facility] - PCP - Established    Colon cancer screening    Relevant Orders    Cologuard® colon cancer screening     Other Visit Diagnoses       Encounter for screening for malignant neoplasm of lung in current smoker with 30 pack year history or greater        Relevant Orders    CT lung screening low dose

## 2024-03-27 NOTE — ASSESSMENT & PLAN NOTE
LDL cholesterol optimal at 66 with HDL cholesterol 46 and triglyceride levels of 123 continue atorvastatin 80 mg daily

## 2024-03-27 NOTE — ASSESSMENT & PLAN NOTE
Stable well compensated continue metoprolol tartrate 50 mg twice a day amlodipine 10 mg daily losartan 50 mg twice a day isosorbide mononitrate 30 mg daily well compensated at this time

## 2024-03-27 NOTE — ASSESSMENT & PLAN NOTE
History of nasal polyps in addition to fluticasone nasal spray start cefdinir 300 mg 2 tablets daily for 10 days symptoms of thick nasal drainage and congestion going on for at least 2 weeks

## 2024-03-27 NOTE — ASSESSMENT & PLAN NOTE
Stable denies anginal complaints at this time follows closely with cardiologyContinue metoprolol tartrate 50 mg every 12 hours and isosorbide mononitrate ER 30 mg daily

## 2024-03-27 NOTE — ASSESSMENT & PLAN NOTE
History of peripheral vascular disease does not report any ischemic pain at rest history of stent continue aspirin 81 mg daily clopidogrel 75 mg daily and cilostazol 100 mg twice a day encourage smoking cessation has cut down to half pack of cigarettes a day follows closely with vascular surgery with annual PVR testing ABIs revealing patent arteries at this time continue aggressive therapy

## 2024-03-27 NOTE — ASSESSMENT & PLAN NOTE
Continue to assist patient in cutting down smoking has made great strides in cutting down from over 2 packs of cigarettes a day to less than half pack a day

## 2024-03-27 NOTE — PROGRESS NOTES
"Subjective   Reason for Visit: Yadiel Basurto is an 63 y.o. male here for a Medicare Wellness visit.          Reviewed all medications by prescribing practitioner or clinical pharmacist (such as prescriptions, OTCs, herbal therapies and supplements) and documented in the medical record.    HPI    Patient Care Team:  Link Fajardo DO as PCP - General  Link Fajardo DO as PCP - [employer] PCP     Review of Systems    Objective   Vitals:  /80 (BP Location: Right arm, Patient Position: Sitting)   Pulse 68   Ht 1.702 m (5' 7\")   Wt 79.8 kg (176 lb)   BMI 27.57 kg/m²       Physical Exam    Assessment/Plan   Problem List Items Addressed This Visit       Benign essential hypertension    Coronary artery disease of native artery of native heart with stable angina pectoris (CMS/HCC)    PAD (peripheral artery disease) (CMS/HCC)    Claudication, intermittent (CMS/HCC)    COPD (chronic obstructive pulmonary disease) with chronic bronchitis    Dyslipidemia, goal LDL below 70    Needs smoking cessation education    Uncomplicated alcohol dependence (CMS/HCC)    Atherosclerosis of native arteries of extremities with rest pain, unspecified extremity (CMS/HCC)    Flu vaccine need          "

## 2024-03-27 NOTE — ASSESSMENT & PLAN NOTE
Continue Symbicort 160/4.52 puffs twice a day with DuoNebs at home work aggressively on smoking cessation

## 2024-04-01 ENCOUNTER — TELEPHONE (OUTPATIENT)
Dept: PRIMARY CARE | Facility: CLINIC | Age: 64
End: 2024-04-01
Payer: COMMERCIAL

## 2024-04-01 DIAGNOSIS — E78.5 DYSLIPIDEMIA, GOAL LDL BELOW 70: ICD-10-CM

## 2024-04-01 RX ORDER — ATORVASTATIN CALCIUM 80 MG/1
80 TABLET, FILM COATED ORAL NIGHTLY
Qty: 90 TABLET | Refills: 3 | Status: SHIPPED | OUTPATIENT
Start: 2024-04-01 | End: 2025-03-27

## 2024-04-01 NOTE — TELEPHONE ENCOUNTER
Med Refill   atorvastatin (Lipitor) 80 mg tablet [[id number]]     [facility]  - [place], [place] - [address]  [address] [address]  Phone: [phone]  Fax: [phone]

## 2024-04-08 ENCOUNTER — HOSPITAL ENCOUNTER (OUTPATIENT)
Dept: VASCULAR MEDICINE | Facility: HOSPITAL | Age: 64
Discharge: HOME | End: 2024-04-08
Payer: COMMERCIAL

## 2024-04-08 DIAGNOSIS — I25.118 CORONARY ARTERY DISEASE OF NATIVE ARTERY OF NATIVE HEART WITH STABLE ANGINA PECTORIS (CMS-HCC): ICD-10-CM

## 2024-04-08 DIAGNOSIS — I73.9 CLAUDICATION, INTERMITTENT (CMS-HCC): ICD-10-CM

## 2024-04-08 DIAGNOSIS — I73.9 PAD (PERIPHERAL ARTERY DISEASE) (CMS-HCC): ICD-10-CM

## 2024-04-08 PROCEDURE — 93922 UPR/L XTREMITY ART 2 LEVELS: CPT | Performed by: INTERNAL MEDICINE

## 2024-04-08 PROCEDURE — 93922 UPR/L XTREMITY ART 2 LEVELS: CPT

## 2024-04-20 PROBLEM — R10.9 LEFT FLANK PAIN: Status: ACTIVE | Noted: 2024-04-20

## 2024-04-20 PROBLEM — R73.9 HYPERGLYCEMIA: Status: ACTIVE | Noted: 2024-03-25

## 2024-04-20 PROBLEM — R53.83 FATIGUE: Status: ACTIVE | Noted: 2024-04-20

## 2024-04-20 PROBLEM — M25.461 EFFUSION OF RIGHT KNEE: Status: ACTIVE | Noted: 2024-04-20

## 2024-04-20 PROBLEM — R05.9 COUGH, UNSPECIFIED: Status: ACTIVE | Noted: 2023-03-28

## 2024-04-20 PROBLEM — I25.2 HISTORY OF MYOCARDIAL INFARCTION: Status: ACTIVE | Noted: 2023-02-15

## 2024-04-20 PROBLEM — J32.9 SINUSITIS: Status: ACTIVE | Noted: 2022-12-30

## 2024-04-20 PROBLEM — I10 BENIGN ESSENTIAL HYPERTENSION: Status: ACTIVE | Noted: 2022-06-20

## 2024-04-20 PROBLEM — R07.9 CHEST PAIN: Status: ACTIVE | Noted: 2024-04-20

## 2024-05-20 ENCOUNTER — APPOINTMENT (OUTPATIENT)
Dept: CARDIOLOGY | Facility: CLINIC | Age: 64
End: 2024-05-20
Payer: COMMERCIAL

## 2024-06-03 ENCOUNTER — OFFICE VISIT (OUTPATIENT)
Dept: PRIMARY CARE | Facility: CLINIC | Age: 64
End: 2024-06-03
Payer: COMMERCIAL

## 2024-06-03 VITALS
BODY MASS INDEX: 27.62 KG/M2 | HEIGHT: 67 IN | DIASTOLIC BLOOD PRESSURE: 70 MMHG | HEART RATE: 68 BPM | WEIGHT: 176 LBS | SYSTOLIC BLOOD PRESSURE: 122 MMHG

## 2024-06-03 DIAGNOSIS — J44.89 COPD (CHRONIC OBSTRUCTIVE PULMONARY DISEASE) WITH CHRONIC BRONCHITIS (MULTI): ICD-10-CM

## 2024-06-03 DIAGNOSIS — I73.9 PAD (PERIPHERAL ARTERY DISEASE) (CMS-HCC): ICD-10-CM

## 2024-06-03 DIAGNOSIS — B35.3 TINEA PEDIS OF BOTH FEET: Primary | ICD-10-CM

## 2024-06-03 PROBLEM — R05.9 COUGH, UNSPECIFIED: Status: RESOLVED | Noted: 2023-03-28 | Resolved: 2024-06-03

## 2024-06-03 PROBLEM — M25.461 EFFUSION OF RIGHT KNEE: Status: RESOLVED | Noted: 2024-04-20 | Resolved: 2024-06-03

## 2024-06-03 PROBLEM — J32.9 SINUSITIS: Status: RESOLVED | Noted: 2022-12-30 | Resolved: 2024-06-03

## 2024-06-03 PROBLEM — R53.83 FATIGUE: Status: RESOLVED | Noted: 2024-04-20 | Resolved: 2024-06-03

## 2024-06-03 PROBLEM — R07.9 CHEST PAIN: Status: RESOLVED | Noted: 2024-04-20 | Resolved: 2024-06-03

## 2024-06-03 PROBLEM — J33.9 NASAL POLYPS: Status: RESOLVED | Noted: 2023-02-15 | Resolved: 2024-06-03

## 2024-06-03 PROBLEM — J01.01 ACUTE RECURRENT MAXILLARY SINUSITIS: Status: RESOLVED | Noted: 2023-10-06 | Resolved: 2024-06-03

## 2024-06-03 PROCEDURE — 3074F SYST BP LT 130 MM HG: CPT | Performed by: INTERNAL MEDICINE

## 2024-06-03 PROCEDURE — 99213 OFFICE O/P EST LOW 20 MIN: CPT | Performed by: INTERNAL MEDICINE

## 2024-06-03 PROCEDURE — 3078F DIAST BP <80 MM HG: CPT | Performed by: INTERNAL MEDICINE

## 2024-06-03 RX ORDER — CLOTRIMAZOLE 1 %
CREAM (GRAM) TOPICAL 2 TIMES DAILY
Qty: 45 G | Refills: 3 | Status: SHIPPED | OUTPATIENT
Start: 2024-06-03 | End: 2024-07-03

## 2024-06-03 RX ORDER — BUDESONIDE AND FORMOTEROL FUMARATE DIHYDRATE 160; 4.5 UG/1; UG/1
2 AEROSOL RESPIRATORY (INHALATION) 2 TIMES DAILY
Qty: 30.6 G | Refills: 3 | Status: SHIPPED
Start: 2024-06-03 | End: 2024-06-03

## 2024-06-03 RX ORDER — FLUTICASONE PROPIONATE AND SALMETEROL 250; 50 UG/1; UG/1
1 POWDER RESPIRATORY (INHALATION)
Qty: 60 EACH | Refills: 11 | Status: SHIPPED | OUTPATIENT
Start: 2024-06-03 | End: 2025-06-03

## 2024-06-03 NOTE — ASSESSMENT & PLAN NOTE
Repeat arterial studies MARCO ANTONIO reveals reduction in circulation related to the severity of vasculopathy history of RFA angioplasty may require reevaluation from interventional cardiology for stent placement lower legs in regards to claudication symptoms on cilostazol at this time twice a day high risk for developing arterial ulcers as well work aggressively smoking cessation

## 2024-06-03 NOTE — PROGRESS NOTES
"Subjective   Reason for Visit: Yadiel Basurto is an 63 y.o. male here for a acute visit.          Reviewed all medications by prescribing practitioner or clinical pharmacist (such as prescriptions, OTCs, herbal therapies and supplements) and documented in the medical record.    Rash  This is a new problem. The current episode started more than 1 month ago. The problem has been gradually worsening since onset. The affected locations include the left foot, right foot and right ankle. The rash is characterized by burning, dryness, pain, redness, scaling, peeling and itchiness. He was exposed to nothing. Past treatments include topical steroids, antibiotic cream, anti-itch cream, antihistamine and antibiotics. The treatment provided no relief. His past medical history is significant for allergies and eczema.       Patient Care Team:  Link Fajardo DO as PCP - General  Link Fajardo DO as PCP - [employer] PCP  Link Fajardo DO as PCP - [employer]vantage PCP     Review of Systems   Skin:  Positive for rash.       Objective   Vitals:  /70 (BP Location: Right leg)   Pulse 68   Ht 1.702 m (5' 7\")   Wt 79.8 kg (176 lb)   BMI 27.57 kg/m²       Physical Exam  Skin:     Findings: Rash present. Rash is macular and scaling.                Assessment/Plan   Problem List Items Addressed This Visit       PAD (peripheral artery disease) (CMS-[facility])    Overview     PVD s/p PTA of left SFA in 2018          Current Assessment & Plan     Repeat arterial studies MARCO ANTONIO reveals reduction in circulation related to the severity of vasculopathy history of RFA angioplasty may require reevaluation from interventional cardiology for stent placement lower legs in regards to claudication symptoms on cilostazol at this time twice a day high risk for developing arterial ulcers as well work aggressively smoking cessation         COPD (chronic obstructive pulmonary disease) with chronic bronchitis " (Multi)    Current Assessment & Plan     Generic Symbicort not being covered we will switch to Wixela 250/51 elation twice daily         Relevant Medications    fluticasone propion-salmeteroL (Wixela Inhub) 250-50 mcg/dose diskus inhaler    Tinea pedis of both feet - Primary    Current Assessment & Plan     Start clotrimazole cream 1% twice a day applied to soles and in between toes to reduce tinea burden reevaluate with next visit         Relevant Medications    clotrimazole (Lotrimin) 1 % cream

## 2024-06-03 NOTE — ASSESSMENT & PLAN NOTE
Start clotrimazole cream 1% twice a day applied to soles and in between toes to reduce tinea burden reevaluate with next visit

## 2024-06-03 NOTE — PROGRESS NOTES
"Subjective   Patient ID: Raoul Basurto is a 63 y.o. male who presents for feet pain  (Bilateral foot pain ).    HPI     Review of Systems    Objective   /70 (BP Location: Right leg)   Pulse 68   Ht 1.702 m (5' 7\")   Wt 79.8 kg (176 lb)   BMI 27.57 kg/m²     Physical Exam    Assessment/Plan          "

## 2024-06-13 DIAGNOSIS — I25.118 CORONARY ARTERY DISEASE OF NATIVE ARTERY OF NATIVE HEART WITH STABLE ANGINA PECTORIS (CMS-HCC): ICD-10-CM

## 2024-06-13 RX ORDER — ISOSORBIDE MONONITRATE 30 MG/1
TABLET, EXTENDED RELEASE ORAL
Qty: 90 TABLET | Refills: 3 | Status: SHIPPED | OUTPATIENT
Start: 2024-06-13

## 2024-07-09 DIAGNOSIS — G89.29 CHRONIC BILATERAL LOW BACK PAIN WITHOUT SCIATICA: ICD-10-CM

## 2024-07-09 DIAGNOSIS — I11.9 HYPERTENSIVE HEART DISEASE WITHOUT HEART FAILURE: Primary | ICD-10-CM

## 2024-07-09 DIAGNOSIS — M54.50 CHRONIC BILATERAL LOW BACK PAIN WITHOUT SCIATICA: ICD-10-CM

## 2024-07-09 RX ORDER — LOSARTAN POTASSIUM 50 MG/1
50 TABLET ORAL EVERY 12 HOURS
Qty: 180 TABLET | Refills: 3 | Status: SHIPPED | OUTPATIENT
Start: 2024-07-09

## 2024-07-09 RX ORDER — CYCLOBENZAPRINE HCL 10 MG
10 TABLET ORAL NIGHTLY PRN
Qty: 90 TABLET | Refills: 3 | Status: SHIPPED | OUTPATIENT
Start: 2024-07-09

## 2024-07-11 ENCOUNTER — LAB (OUTPATIENT)
Dept: LAB | Facility: LAB | Age: 64
End: 2024-07-11
Payer: COMMERCIAL

## 2024-07-11 DIAGNOSIS — I73.9 PAD (PERIPHERAL ARTERY DISEASE) (CMS-HCC): ICD-10-CM

## 2024-07-11 DIAGNOSIS — R73.02 IGT (IMPAIRED GLUCOSE TOLERANCE): ICD-10-CM

## 2024-07-11 DIAGNOSIS — J44.89 COPD (CHRONIC OBSTRUCTIVE PULMONARY DISEASE) WITH CHRONIC BRONCHITIS (MULTI): ICD-10-CM

## 2024-07-11 DIAGNOSIS — E78.5 DYSLIPIDEMIA, GOAL LDL BELOW 70: ICD-10-CM

## 2024-07-11 DIAGNOSIS — I25.118 CORONARY ARTERY DISEASE OF NATIVE ARTERY OF NATIVE HEART WITH STABLE ANGINA PECTORIS (CMS-HCC): ICD-10-CM

## 2024-07-11 LAB
ALBUMIN SERPL BCP-MCNC: 4.3 G/DL (ref 3.4–5)
ALP SERPL-CCNC: 104 U/L (ref 33–136)
ALT SERPL W P-5'-P-CCNC: 17 U/L (ref 10–52)
ANION GAP SERPL CALC-SCNC: 12 MMOL/L (ref 10–20)
AST SERPL W P-5'-P-CCNC: 16 U/L (ref 9–39)
BILIRUB SERPL-MCNC: 0.4 MG/DL (ref 0–1.2)
BUN SERPL-MCNC: 11 MG/DL (ref 6–23)
CALCIUM SERPL-MCNC: 9.4 MG/DL (ref 8.6–10.3)
CHLORIDE SERPL-SCNC: 106 MMOL/L (ref 98–107)
CHOLEST SERPL-MCNC: 128 MG/DL (ref 0–199)
CHOLESTEROL/HDL RATIO: 2.6
CO2 SERPL-SCNC: 23 MMOL/L (ref 21–32)
CREAT SERPL-MCNC: 1.04 MG/DL (ref 0.5–1.3)
EGFRCR SERPLBLD CKD-EPI 2021: 81 ML/MIN/1.73M*2
GLUCOSE SERPL-MCNC: 85 MG/DL (ref 74–99)
HDLC SERPL-MCNC: 48.5 MG/DL
LDLC SERPL CALC-MCNC: 51 MG/DL
NON HDL CHOLESTEROL: 80 MG/DL (ref 0–149)
POTASSIUM SERPL-SCNC: 4.7 MMOL/L (ref 3.5–5.3)
PROT SERPL-MCNC: 7 G/DL (ref 6.4–8.2)
SODIUM SERPL-SCNC: 136 MMOL/L (ref 136–145)
TRIGL SERPL-MCNC: 144 MG/DL (ref 0–149)
VLDL: 29 MG/DL (ref 0–40)

## 2024-07-11 PROCEDURE — 36415 COLL VENOUS BLD VENIPUNCTURE: CPT

## 2024-07-11 PROCEDURE — 83036 HEMOGLOBIN GLYCOSYLATED A1C: CPT

## 2024-07-11 PROCEDURE — 80053 COMPREHEN METABOLIC PANEL: CPT

## 2024-07-11 PROCEDURE — 80061 LIPID PANEL: CPT

## 2024-07-12 ENCOUNTER — TELEPHONE (OUTPATIENT)
Dept: PRIMARY CARE | Facility: CLINIC | Age: 64
End: 2024-07-12
Payer: COMMERCIAL

## 2024-07-12 DIAGNOSIS — L30.8 OTHER ECZEMA: Primary | ICD-10-CM

## 2024-07-12 LAB
EST. AVERAGE GLUCOSE BLD GHB EST-MCNC: 114 MG/DL
HBA1C MFR BLD: 5.6 %

## 2024-07-12 RX ORDER — PRENATAL VIT 91/IRON/FOLIC/DHA 28-975-200
COMBINATION PACKAGE (EA) ORAL 2 TIMES DAILY
Qty: 100 G | Refills: 3 | Status: SHIPPED | OUTPATIENT
Start: 2024-07-12

## 2024-07-12 NOTE — TELEPHONE ENCOUNTER
Creme you prescribed for rash on feet did not help.    Is there anything else he can try ?    Luis Alberto Elmore

## 2024-07-19 ENCOUNTER — CLINICAL SUPPORT (OUTPATIENT)
Dept: NUTRITION | Facility: HOSPITAL | Age: 64
End: 2024-07-19
Payer: COMMERCIAL

## 2024-07-19 NOTE — PROGRESS NOTES
[employer]  Diet Recommendation 1: Soft (Chewing Difficulty)  Diet Recommendation 2: Calories, High  Nutrition Goals Stated: None  Household Size: 2 Family Members  Interventions: Referral Number: 5th 6 Mo Referral 2.5 yrs (Referrals may not be consecutive)  Interventions: Visit Number: 5 of 6 Visits - Max 6 Visits/Referral Each 6 Mo Period  Education Today: [employer] Meals  Recipes Today: None  Grains: 0-25% Whole  Fruit: 50-75% Fresh  Vegetables: Fresh - 100%  Proteins: 1-2 Plant-based Items  Dairy: 0-25% Lowfat  Originating Site of Referral Order: [facility]  Initials of RD Assisting Today: [facility]

## 2024-07-23 DIAGNOSIS — F51.04 CHRONIC INSOMNIA: ICD-10-CM

## 2024-07-23 RX ORDER — TRAZODONE HYDROCHLORIDE 100 MG/1
100 TABLET ORAL NIGHTLY
Qty: 90 TABLET | Refills: 3 | Status: SHIPPED | OUTPATIENT
Start: 2024-07-23

## 2024-07-31 ENCOUNTER — HOSPITAL ENCOUNTER (EMERGENCY)
Facility: HOSPITAL | Age: 64
Discharge: HOME | End: 2024-07-31
Payer: COMMERCIAL

## 2024-07-31 ENCOUNTER — APPOINTMENT (OUTPATIENT)
Dept: RADIOLOGY | Facility: HOSPITAL | Age: 64
End: 2024-07-31
Payer: COMMERCIAL

## 2024-07-31 VITALS
OXYGEN SATURATION: 96 % | BODY MASS INDEX: 27 KG/M2 | WEIGHT: 172 LBS | HEART RATE: 62 BPM | TEMPERATURE: 97.3 F | SYSTOLIC BLOOD PRESSURE: 158 MMHG | RESPIRATION RATE: 18 BRPM | DIASTOLIC BLOOD PRESSURE: 85 MMHG | HEIGHT: 67 IN

## 2024-07-31 DIAGNOSIS — M54.12 CERVICAL RADICULOPATHY: Primary | ICD-10-CM

## 2024-07-31 PROCEDURE — 72125 CT NECK SPINE W/O DYE: CPT

## 2024-07-31 PROCEDURE — 96372 THER/PROPH/DIAG INJ SC/IM: CPT | Performed by: PHYSICIAN ASSISTANT

## 2024-07-31 PROCEDURE — 99284 EMERGENCY DEPT VISIT MOD MDM: CPT | Mod: 25

## 2024-07-31 PROCEDURE — 72125 CT NECK SPINE W/O DYE: CPT | Performed by: RADIOLOGY

## 2024-07-31 PROCEDURE — 2500000004 HC RX 250 GENERAL PHARMACY W/ HCPCS (ALT 636 FOR OP/ED): Performed by: PHYSICIAN ASSISTANT

## 2024-07-31 RX ORDER — METHYLPREDNISOLONE 4 MG/1
TABLET ORAL
Qty: 21 TABLET | Refills: 0 | Status: SHIPPED | OUTPATIENT
Start: 2024-07-31 | End: 2024-08-07

## 2024-07-31 RX ORDER — LIDOCAINE 50 MG/G
1 PATCH TOPICAL DAILY
Qty: 7 PATCH | Refills: 0 | Status: SHIPPED | OUTPATIENT
Start: 2024-07-31

## 2024-07-31 RX ORDER — TIZANIDINE 2 MG/1
2 TABLET ORAL EVERY 6 HOURS PRN
Qty: 30 TABLET | Refills: 0 | Status: SHIPPED | OUTPATIENT
Start: 2024-07-31 | End: 2024-08-10

## 2024-07-31 RX ORDER — KETOROLAC TROMETHAMINE 30 MG/ML
30 INJECTION, SOLUTION INTRAMUSCULAR; INTRAVENOUS ONCE
Status: COMPLETED | OUTPATIENT
Start: 2024-07-31 | End: 2024-07-31

## 2024-07-31 ASSESSMENT — LIFESTYLE VARIABLES
HAVE PEOPLE ANNOYED YOU BY CRITICIZING YOUR DRINKING: NO
EVER HAD A DRINK FIRST THING IN THE MORNING TO STEADY YOUR NERVES TO GET RID OF A HANGOVER: NO
HAVE YOU EVER FELT YOU SHOULD CUT DOWN ON YOUR DRINKING: YES
TOTAL SCORE: 1
EVER FELT BAD OR GUILTY ABOUT YOUR DRINKING: NO

## 2024-07-31 ASSESSMENT — PAIN DESCRIPTION - ORIENTATION: ORIENTATION: LEFT

## 2024-07-31 ASSESSMENT — PAIN SCALES - GENERAL
PAINLEVEL_OUTOF10: 7
PAINLEVEL_OUTOF10: 3

## 2024-07-31 ASSESSMENT — PAIN - FUNCTIONAL ASSESSMENT: PAIN_FUNCTIONAL_ASSESSMENT: 0-10

## 2024-07-31 ASSESSMENT — PAIN DESCRIPTION - LOCATION: LOCATION: SHOULDER

## 2024-07-31 ASSESSMENT — PAIN DESCRIPTION - PAIN TYPE: TYPE: ACUTE PAIN

## 2024-07-31 NOTE — ED PROVIDER NOTES
EMERGENCY MEDICINE EVALUATION NOTE    History of Present Illness     Chief Complaint: No chief complaint on file.      HPI: Yadiel Basurto is a 63 y.o. male presents with a chief complaint of left shoulder and arm pain.  Patient reports that he has been having pain coming from the left side of his neck down his left arm for the last several weeks to months.  He reports that he does have an appointment scheduled with his primary care provider but they cannot get him in until September.  He states that the pain is worsened with any movement of the neck also hurts with any abduction or extension of left upper extremity.  Patient denies any associated chest pain or shortness of breath.  Denies any fevers or chills.  Patient denies any loss of neurovascular status to left upper extremity but occasionally gets some tingling.  Patient reports taking over-the-counter Motrin for his symptoms at home however it only minimally improves his symptoms.  Patient denies any previous surgeries or fractures to the neck or left shoulder.    Previous History     Past Medical History:   Diagnosis Date    Abnormal result of other cardiovascular function study 03/28/2018    Abnormal stress echocardiogram    Acute frontal sinusitis, unspecified 04/12/2021    Acute frontal sinusitis, recurrence not specified    Atherosclerosis of native arteries of extremities with intermittent claudication, unspecified extremity (CMS-[facility]) 10/25/2019    Extremity atherosclerosis with intermittent claudication    Community acquired pneumonia of left lung 04/06/2023    Effusion, right knee 12/11/2020    Effusion of right knee    Encounter for other preprocedural examination 09/26/2019    Preoperative clearance    Hyperlipidemia, unspecified 12/03/2019    Hyperlipidemia, mild    Old myocardial infarction     History of myocardial infarction    Other tear of medial meniscus, current injury, left knee, initial encounter 01/03/2020    Acute medial meniscus tear  of left knee, initial encounter    Pain in right shoulder 07/15/2021    Shoulder pain, right    Personal history of other diseases of the circulatory system 12/03/2019    History of ischemic heart disease    Personal history of other diseases of the respiratory system 04/12/2021    History of sinusitis    Personal history of other diseases of the respiratory system 11/07/2018    History of deviated nasal septum    Personal history of other endocrine, nutritional and metabolic disease     History of hyperlipidemia    Personal history of other specified conditions     History of shortness of breath    Personal history of other specified conditions 04/18/2018    History of precordial chest pain    Personal history of other specified conditions 07/15/2021    History of fatigue    Personal history of other specified conditions 11/07/2018    History of epistaxis    Segmental and somatic dysfunction of thoracic region 06/30/2020    Segmental and somatic dysfunction of thoracic region    Sprain of other specified parts of left knee, initial encounter 03/20/2020    Sprain of other ligament of left knee, initial encounter    Strain of muscle and tendon of unspecified wall of thorax, initial encounter 06/30/2020    Strain of thoracic region, initial encounter     Past Surgical History:   Procedure Laterality Date    CORONARY ANGIOPLASTY  04/17/2018    PTCA    HERNIA REPAIR  03/20/2018    Inguinal Hernia Repair    OTHER SURGICAL HISTORY  12/03/2019    Meniscus repair     Social History     Tobacco Use    Smoking status: Every Day     Current packs/day: 0.50     Types: Cigarettes    Smokeless tobacco: Never   Vaping Use    Vaping status: Never Used   Substance Use Topics    Alcohol use: Yes    Drug use: Never     Family History   Problem Relation Name Age of Onset    Heart disease Mother      Heart attack Mother      Lung cancer Mother      Throat cancer Father       Allergies   Allergen Reactions    Lisinopril Swelling     face     Penicillins Unknown     Patient was  young does not remember     Current Outpatient Medications   Medication Instructions    albuterol 2.5 mg /3 mL (0.083 %) nebulizer solution inhalation, Every 4 hours PRN    albuterol 90 mcg/actuation inhaler 2 puffs, inhalation, Every 4 hours PRN, Proair inhaler    amLODIPine (NORVASC) 10 mg, oral, Daily    aspirin 81 mg EC tablet 1 tablet, oral, Daily    atorvastatin (LIPITOR) 80 mg, oral, Nightly    cholecalciferol (Vitamin D-3) 125 MCG (5000 UT) capsule 1 capsule, oral, Daily    cilostazol (PLETAL) 100 mg, oral, 2 times daily    clopidogrel (PLAVIX) 75 mg, oral, Daily    cyanocobalamin, vitamin B-12, 1,000 mcg tablet, sublingual 1 tablet, sublingual, Daily    cyclobenzaprine (FLEXERIL) 10 mg, oral, Nightly PRN    diclofenac sodium 2 g, Daily RT, APPLY TO UPPER EXTREMITIES, 2 GM OF GEL TO AFFECTED AREA 41 TIMES DAILY- DO NOT APPLY MORE THAN 8 GM DAILY    fluticasone propion-salmeteroL (Wixela Inhub) 250-50 mcg/dose diskus inhaler 1 puff, inhalation, 2 times daily RT, Rinse mouth with water after use to reduce aftertaste and incidence of candidiasis. Do not swallow.    ipratropium-albuteroL (Duo-Neb) 0.5-2.5 mg/3 mL nebulizer solution 3 mL, inhalation, 4 times daily RT    isosorbide mononitrate ER (Imdur) 30 mg 24 hr tablet TAKE ONE TABLET BY MOUTH EVERY MORNING    lidocaine (Lidoderm) 5 % patch 1 patch, transdermal, Daily, Remove & discard patch within 12 hours or as directed by MD.    losartan (COZAAR) 50 mg, oral, Every 12 hours    methylPREDNISolone (Medrol Dospak) 4 mg tablets Follow schedule on package instructions    metoprolol tartrate (LOPRESSOR) 50 mg, oral, Every 12 hours    tamsulosin (FLOMAX) 0.4 mg, oral, Daily    terbinafine (AntifungaL, terbinafine,) 1 % cream Topical, 2 times daily    tiZANidine (ZANAFLEX) 2 mg, oral, Every 6 hours PRN    traZODone (DESYREL) 100 mg, oral, Nightly       Physical Exam     Appearance: Alert, oriented , cooperative     Skin:  "Intact,  dry skin, no lesions, rash, petechiae or purpura.      Eyes: PERRLA, EOMs intact,  Conjunctiva pink      ENT: Hearing grossly intact. Pharynx clear     Neck: Supple. Trachea at midline.      Pulmonary: Clear bilaterally. No rales, rhonchi or wheezing. No accessory muscle use or stridor.     Cardiac: Normal rate and rhythm without murmur     Abdomen: Soft, nontender, active bowel sounds.     Musculoskeletal: Full range of motion.  Patient able to flex as well as extend and abduct left shoulder on my examination.  Positive Spurling's test consistent with cervical radiculopathy of left upper extremity.  Neurovascular intact in bilateral extremities with good  strength.     Neurological:Cranial nerves II through XII are grossly intact, normal sensation, no weakness, no focal findings identified.     Results   Labs Reviewed - No data to display  CT cervical spine wo IV contrast   Final Result   1. No acute fracture or traumatic malalignment.   2. Degenerative disc disease and spondylosis as described in the body   of the report.   3. High-grade neural foramina stenosis as described in the body of   the report.             Signed by: Kishan Bello 7/31/2024 6:04 PM   Dictation workstation:   [id number]            ED Course & Medical Decision Making     Medications   ketorolac (Toradol) injection 30 mg (30 mg intramuscular Given 7/31/24 1649)     Heart Rate:  [74]   Temperature:  [36.7 °C (98 °F)]   Respirations:  [16]   BP: (133)/(87)   Height:  [170.2 cm (5' 7\")]   Weight:  [78 kg (172 lb)]   Pulse Ox:  [95 %]    ED Course as of 07/31/24 1842 Wed Jul 31, 2024   1644 Plan of care discussed with the patient.  Patient was evaluated at this time.  On examination he does have an exam consistent with cervical radiculopathy of the left upper extremities is a positive Spurling's test.  We discussed plan of care going forward.  Patient denies any chest pain and declined any EKG or cardiac workup.  He is in " agreement with a shot of Toradol and CT imaging of the neck.  Patient be reassessed after imaging. [CJ]   1839 Updated the patient on the plan of care.  Patient's CT imaging did show some foraminal narrowing as well as some disc height loss.  There is no acute fractures.  Micro shows the patient to be discharged home with Medrol Dosepak and tizanidine.  Patient also given lidocaine patches.  He was encouraged to follow-up with his spine doctor as scheduled.  He is also encouraged return to the Emergency Department immediately with any worsening symptoms.  Once again patient declined any cardiac workup.  Patient agreed with the plan of care discharge. [CJ]      ED Course User Index  [CJ] Domingo Holland PA-C         Diagnoses as of 07/31/24 1842   Cervical radiculopathy       Procedures   Procedures    Diagnosis     1. Cervical radiculopathy        Disposition   Discharged    ED Prescriptions       Medication Sig Dispense Start Date End Date Auth. Provider    methylPREDNISolone (Medrol Dospak) 4 mg tablets Follow schedule on package instructions 21 tablet 7/31/2024 8/7/2024 Domingo Holland PA-C    tiZANidine (Zanaflex) 2 mg tablet Take 1 tablet (2 mg) by mouth every 6 hours if needed for muscle spasms for up to 10 days. 30 tablet 7/31/2024 8/10/2024 Domingo Holland PA-C    lidocaine (Lidoderm) 5 % patch Place 1 patch over 12 hours on the skin once daily. Remove & discard patch within 12 hours or as directed by MD. 7 patch 7/31/2024 -- Domingo Holland PA-C            Disclaimer: This note was dictated by speech recognition. Minor errors in transcription may be present. Please call if questions.       Domingo Holland PA-C  07/31/24 1842

## 2024-08-05 ENCOUNTER — TELEPHONE (OUTPATIENT)
Dept: CARDIOLOGY | Facility: HOSPITAL | Age: 64
End: 2024-08-05
Payer: COMMERCIAL

## 2024-08-05 PROBLEM — I25.10 ARTERIOSCLEROSIS OF CORONARY ARTERY: Status: ACTIVE | Noted: 2023-02-15

## 2024-08-05 NOTE — TELEPHONE ENCOUNTER
LVM for pt stating I would like to go over meds for their upcoming appt. Stated that they can bring in an updated med list or med bottles to appt. Stated pt does not need to call office back.

## 2024-08-06 ENCOUNTER — APPOINTMENT (OUTPATIENT)
Dept: CARDIOLOGY | Facility: CLINIC | Age: 64
End: 2024-08-06
Payer: COMMERCIAL

## 2024-08-06 VITALS
BODY MASS INDEX: 27.44 KG/M2 | HEART RATE: 65 BPM | WEIGHT: 174.8 LBS | OXYGEN SATURATION: 96 % | HEIGHT: 67 IN | SYSTOLIC BLOOD PRESSURE: 140 MMHG | DIASTOLIC BLOOD PRESSURE: 80 MMHG

## 2024-08-06 DIAGNOSIS — I25.118 CORONARY ARTERY DISEASE OF NATIVE ARTERY OF NATIVE HEART WITH STABLE ANGINA PECTORIS (CMS-HCC): ICD-10-CM

## 2024-08-06 DIAGNOSIS — I73.9 CLAUDICATION, INTERMITTENT (CMS-HCC): ICD-10-CM

## 2024-08-06 DIAGNOSIS — I73.9 PAD (PERIPHERAL ARTERY DISEASE) (CMS-HCC): ICD-10-CM

## 2024-08-06 PROCEDURE — 3079F DIAST BP 80-89 MM HG: CPT | Performed by: INTERNAL MEDICINE

## 2024-08-06 PROCEDURE — 3077F SYST BP >= 140 MM HG: CPT | Performed by: INTERNAL MEDICINE

## 2024-08-06 PROCEDURE — 3008F BODY MASS INDEX DOCD: CPT | Performed by: INTERNAL MEDICINE

## 2024-08-06 PROCEDURE — 99213 OFFICE O/P EST LOW 20 MIN: CPT | Performed by: INTERNAL MEDICINE

## 2024-08-06 RX ORDER — METOPROLOL TARTRATE 50 MG/1
50 TABLET ORAL EVERY 12 HOURS
Qty: 180 TABLET | Refills: 3 | Status: SHIPPED | OUTPATIENT
Start: 2024-08-06

## 2024-08-06 NOTE — PROGRESS NOTES
"Counseling:  The patient was counseled regarding diagnostic results, instructions for management, risk factor reductions, prognosis, patient and family education, impressions, risks and benefits of treatment options and importance of compliance with treatment.      Chief Complaint:   The patient presents today for 4-month followup of PVD, CAD and dyslipidemia.     History Of Present Illness:    Yadiel Basurto is a 63 year old male patient who presents today for 4-month followup of PVD, CAD and dyslipidemia. His PMH is significant for HTN, COPD, CAD, PVD s/p PTA of left SFA in 2018 and dyslipidemia. Over the past 4 months, the patient states that he has done well from a cardiac standpoint. He denies any CP, chest discomfort or SOB. He reports a 1-month history of left arm pain that is constant in nature and worsened over the past week. He presented to the ED on 07/31/2024 in this regard, where his exam was consistent with cervical radiculopathy of the left upper extremity (positive Spurling's test).      Last Recorded Vitals:  Vitals:    08/06/24 1018   BP: 140/80   BP Location: Right arm   Patient Position: Sitting   BP Cuff Size: Adult   Pulse: 65   SpO2: 96%   Weight: 79.3 kg (174 lb 12.8 oz)   Height: 1.702 m (5' 7\")       Past Surgical History:  He has a past surgical history that includes Hernia repair (03/20/2018); Other surgical history (12/03/2019); and Coronary angioplasty (04/17/2018).      Social History:  He reports that he has been smoking cigarettes. He has never used smokeless tobacco. He reports current alcohol use. He reports that he does not use drugs.    Family History:  Family History   Problem Relation Name Age of Onset    Heart disease Mother      Heart attack Mother      Lung cancer Mother      Throat cancer Father          Allergies:  Lisinopril and Penicillins    Outpatient Medications:  Current Outpatient Medications   Medication Instructions    albuterol 2.5 mg /3 mL (0.083 %) nebulizer " solution inhalation, Every 4 hours PRN    albuterol 90 mcg/actuation inhaler 2 puffs, inhalation, Every 4 hours PRN, Proair inhaler    amLODIPine (NORVASC) 10 mg, oral, Daily    aspirin 81 mg EC tablet 1 tablet, oral, Daily    atorvastatin (LIPITOR) 80 mg, oral, Nightly    cholecalciferol (Vitamin D-3) 125 MCG (5000 UT) capsule 1 capsule, oral, Daily    cilostazol (PLETAL) 100 mg, oral, 2 times daily    clopidogrel (PLAVIX) 75 mg, oral, Daily    cyanocobalamin, vitamin B-12, 1,000 mcg tablet, sublingual 1 tablet, sublingual, Daily    cyclobenzaprine (FLEXERIL) 10 mg, oral, Nightly PRN    diclofenac sodium 2 g, Daily RT, APPLY TO UPPER EXTREMITIES, 2 GM OF GEL TO AFFECTED AREA 41 TIMES DAILY- DO NOT APPLY MORE THAN 8 GM DAILY    fluticasone propion-salmeteroL (Wixela Inhub) 250-50 mcg/dose diskus inhaler 1 puff, inhalation, 2 times daily RT, Rinse mouth with water after use to reduce aftertaste and incidence of candidiasis. Do not swallow.    ipratropium-albuteroL (Duo-Neb) 0.5-2.5 mg/3 mL nebulizer solution 3 mL, inhalation, 4 times daily RT    isosorbide mononitrate ER (Imdur) 30 mg 24 hr tablet TAKE ONE TABLET BY MOUTH EVERY MORNING    lidocaine (Lidoderm) 5 % patch 1 patch, transdermal, Daily, Remove & discard patch within 12 hours or as directed by MD.    losartan (COZAAR) 50 mg, oral, Every 12 hours    methylPREDNISolone (Medrol Dospak) 4 mg tablets Follow schedule on package instructions    metoprolol tartrate (LOPRESSOR) 50 mg, oral, Every 12 hours    tamsulosin (FLOMAX) 0.4 mg, oral, Daily    terbinafine (AntifungaL, terbinafine,) 1 % cream Topical, 2 times daily    tiZANidine (ZANAFLEX) 2 mg, oral, Every 6 hours PRN    traZODone (DESYREL) 100 mg, oral, Nightly     Review of Systems   Musculoskeletal:         Left arm pain   All other systems reviewed and are negative.     Physical Exam:  Constitutional:       Appearance: Healthy appearance. Not in distress.   Neck:      Vascular: No JVR. JVD normal.    Pulmonary:      Effort: Pulmonary effort is normal.      Breath sounds: Normal breath sounds. No wheezing. No rhonchi. No rales.   Chest:      Chest wall: Not tender to palpatation.   Cardiovascular:      PMI at left midclavicular line. Normal rate. Regular rhythm. Normal S1. Normal S2.       Murmurs: There is no murmur.      No gallop.  No click. No rub.   Pulses:     Intact distal pulses.   Edema:     Peripheral edema absent.   Abdominal:      General: Bowel sounds are normal.      Palpations: Abdomen is soft.      Tenderness: There is no abdominal tenderness.   Musculoskeletal: Normal range of motion.         General: No tenderness. Skin:     General: Skin is warm and dry.   Neurological:      General: No focal deficit present.      Mental Status: Alert and oriented to person, place and time.          Last Labs:  CBC -  Lab Results   Component Value Date    WBC 12.8 (H) 03/25/2024    HGB 15.2 03/25/2024    HCT 44.3 03/25/2024     (H) 03/25/2024     03/25/2024       CMP -  Lab Results   Component Value Date    CALCIUM 9.4 07/11/2024    PHOS 2.7 04/06/2018    PROT 7.0 07/11/2024    ALBUMIN 4.3 07/11/2024    AST 16 07/11/2024    ALT 17 07/11/2024    ALKPHOS 104 07/11/2024    BILITOT 0.4 07/11/2024       LIPID PANEL -   Lab Results   Component Value Date    CHOL 128 07/11/2024    TRIG 144 07/11/2024    HDL 48.5 07/11/2024    CHHDL 2.6 07/11/2024    LDLF 46 12/30/2022    VLDL 29 07/11/2024    NHDL 80 07/11/2024       RENAL FUNCTION PANEL -   Lab Results   Component Value Date    GLUCOSE 85 07/11/2024     07/11/2024    K 4.7 07/11/2024     07/11/2024    CO2 23 07/11/2024    ANIONGAP 12 07/11/2024    BUN 11 07/11/2024    CREATININE 1.04 07/11/2024    GFRMALE 86 12/30/2022    CALCIUM 9.4 07/11/2024    PHOS 2.7 04/06/2018    ALBUMIN 4.3 07/11/2024        Lab Results   Component Value Date    HGBA1C 5.6 07/11/2024       Last Cardiology Tests:  04/08/2024 - Vascular Lab [facility]  1. Right Lower PVR:  Evidence of mild arterial occlusive disease in the right lower extremity at rest. Decreased digital perfusion noted. Biphasic flow is noted in the right posterior tibial artery and right dorsalis pedis artery. Triphasic flow is noted in the right common femoral artery.  2. Left Lower PVR: Evidence of moderate arterial occlusive disease in the left lower extremity at rest. Decreased digital perfusion noted. Monophasic flow is noted in the left dorsalis pedis artery. Biphasic flow is noted in the left common femoral artery and left posterior tibial artery.    10/20/2022 - Vascular Lab [facility]  1. Right Lower PVR: No evidence of arterial occlusive disease in the right lower extremity at rest. Normal digital perfusion noted. Biphasic flow is noted in the right posterior tibial artery, right dorsalis pedis artery and right common femoral artery.  2. Left Lower PVR: No evidence of arterial occlusive disease in the left lower extremity at rest. Normal digital perfusion noted. Biphasic flow is noted in the left posterior tibial artery and left dorsalis pedis artery. Triphasic flow is noted in the left common femoral artery.    03/08/2022 - Vascular Lab PVR [facility] Only   1. Bilateral Lower PVR: No evidence of arterial occlusive disease bilaterally in the lower extremities at rest.  2. Right Lower PVR: Normal digital perfusion noted. Biphasic flow is noted in the right posterior tibial artery and right dorsalis pedis artery. Triphasic flow is noted in the right common femoral artery.  3. Left Lower PVR: Normal digital perfusion noted. Biphasic flow is noted in the left dorsalis pedis artery. Triphasic flow is noted in the left common femoral artery and left posterior tibial artery.     03/08/2022 - TTE  The left ventricular systolic function is normal with a 60% estimated ejection fraction.     03/02/2021 - Vascular Lab - PVR [facility] Only  1. Bilateral Lower PVR: No evidence of arterial occlusive disease bilaterally in the lower  extremities at rest.  2. Right Lower PVR: Normal digital perfusion noted. Biphasic flow is noted in the right posterior tibial artery and right dorsalis pedis artery. Triphasic flow is noted in the right common femoral artery and right popliteal artery.  3. Left Lower PVR: Normal digital perfusion noted. Biphasic flow is noted in the left dorsalis pedis artery. Triphasic flow is noted in the left common femoral artery, left popliteal artery and left posterior tibial artery.     10/14/2019 Cardiac Catheterization  1. Mild non-obstructive coronary artery disease.  2. Elevated LV filling pressures.  3. Left Ventricular end-diastolic pressure = 23.     10/8/2019 - Stress Test  1. The resting ejection fraction was estimated at 40 to 45% with a peak exercise ejection fraction estimated at 40 to 45%.  2. Mildly decreased global left ventricular systolic function.  3. Stage: Rest: Multiple segmental abnormalities exist. See findings. Stage: Impost: Multiple segmental abnormalities exist. See findings.  4. Abnormal stress echocardiogram with RCA territory scar and stress-induced ischemia with a generalized hypokinesis.  5. At peak, there are stress-induced regional wall motion abnormalities.  6. Mildly to moderately decreased peak stress global LV systolic function.  7. No ischemia by ECG at submax workload.  8. The inadequate level of stress was achieved.     11/13/2018 - Cardiac Catheterization  1. Severe atherosclerotic disease of left SFA.  2. Successful atherectomy, PTA of left SFA.     05/01/2018 - Stress Test  1. Nondiagnostic graded exercise treadmill stress test secondary to submaximal heart rate.  2. Baig treadmill score is not applicable for this patient.      04/05/2018 - Echo  1. The left ventricular systolic function is low normal with a 50% estimated ejection fraction.  2. Spectral Doppler shows an impaired relaxation pattern of left ventricular diastolic filling.  3. UEA not used. Previously noted wall motion  abnormality not evident on this study.     04/04/2018 - Cardiac Catheterization  1. Single vessel coronary artery disease without proximal left anterior descending involvement.  2. Culprit vessel(s): right coronary artery.  3. S/p orbital atherectomy of proximal RCA.  4. S/p PCI of distal RCA with GERRY x 2.  5. S/p PCI of proximal RCA with GERRY x 1.     03/28/2018 - Cardiac Catheterization  1. Severe single vessel disease in the mid RCA, consisting of a heavily calcified 60-80% stenosis.  2. Moderate disease in the PDA.  3. Left ventricular end-diastolic pressure = 8.      03/20/2018 - Stress Test  1. Abnormal exercise stress echocardiogram due to right coronary artery ischemia.  2. Hypertensive blood pressure response may reduce spasticity of the exam.  3. 79% max predicted heart rate in 7 METs.   4. Blood pressure with a hypertensive response.  5. ECG findings were nondiagnostic.  6. Test ended due to patient's fatigue.  7. Stress provoked an increase in chest tightness from 3/10 to 4/10.     Lab review: I have personally reviewed the laboratory result(s).  Diagnostic review: I have personally reviewed the result(s) of the [facility] from 04/2024.       Assessment/Plan   1) PVD S/P PTA left SFA in 2018  On Plavix 75 mg daily, Pletal 100 mg BID  [facility] 10/2022 with no evidence of arterial occlusive disease bilaterally   Wears diabetic socks  [facility] 04/08/2024 with mild arterial occlusive disease RLE, moderate arterial occlusive disease LLE   Stable   Continues to work on smoking cessation   Continue current medical Rx   Repeat [facility] 6 months  Followup with Milly Cardoza NP, in 6 months    2) CAD s/p PCI of RCA (Complex Intervention with Rotational Atherectomy) in 2018  On ASA 81 mg daily, Plavix 75 mg daily, Imdur 30 mg daily, metoprolol tartrate 50 mg BID, amlodipine 10 mg daily, losartan 50 mg BID, atorvastatin 80 mg daily.  LHC in 2019 with patent stents  TTE March 2022 with LVEF 55%  Denies CP, chest discomfort or SOB  BP  stable  Continue current medical Rx   Followup with Milly Cardoza NP, in 6 months     3) Smoking  Discussed smoking cessation face to face and explained detrimental effects to the patients health including worsening lung disease, HTN and CAD. Educated on benefits on quitting smoking including reducing risk of CAD, lung disease, peptic ulcers, cancer and osteoporosis. Educated on available options to help in quitting including nicotine patch, Nicorette gum/lozenges, Chantix and Wellbutrin. Counseling time spent on smoking cessation was 5 minutes.   -Failed Chantix, Wellbutrin and patches.   -Continuing his efforts in smoking cessation      4) Dyslipidemia  On atorvastatin 80 mg daily   Goal LDL < 70  Lipid panel 07/11/2024 with LDL of 51  Continue current medical Rx   Followup with Milly Cardoza NP, in 6 months      Scribe Attestation  By signing my name below, IXochitl Scribe   attest that this documentation has been prepared under the direction and in the presence of Bro Syed MD.    sibling(s)

## 2024-08-06 NOTE — PATIENT INSTRUCTIONS
Continue all current medications as prescribed.  Repeat pressure measurement ultrasound of the legs in 6 months.  Continue your efforts in quitting smoking.  Dr. Syed has recommended that you followup with your primary care provider regarding your left arm pain and request a referral to orthopedics.   Followup with Milly Cardoza NP, in 6 months.      If you have any questions or cardiac concerns, please call our office at [phone].

## 2024-08-09 ENCOUNTER — OFFICE VISIT (OUTPATIENT)
Dept: PRIMARY CARE | Facility: CLINIC | Age: 64
End: 2024-08-09
Payer: COMMERCIAL

## 2024-08-09 VITALS
WEIGHT: 176.4 LBS | SYSTOLIC BLOOD PRESSURE: 150 MMHG | OXYGEN SATURATION: 97 % | HEIGHT: 67 IN | HEART RATE: 76 BPM | DIASTOLIC BLOOD PRESSURE: 80 MMHG | BODY MASS INDEX: 27.69 KG/M2 | RESPIRATION RATE: 18 BRPM

## 2024-08-09 DIAGNOSIS — G89.29 CHRONIC LEFT SHOULDER PAIN: ICD-10-CM

## 2024-08-09 DIAGNOSIS — M25.512 CHRONIC LEFT SHOULDER PAIN: ICD-10-CM

## 2024-08-09 DIAGNOSIS — J06.9 UPPER RESPIRATORY TRACT INFECTION, UNSPECIFIED TYPE: Primary | ICD-10-CM

## 2024-08-09 PROCEDURE — 3008F BODY MASS INDEX DOCD: CPT

## 2024-08-09 PROCEDURE — 3079F DIAST BP 80-89 MM HG: CPT

## 2024-08-09 PROCEDURE — 99214 OFFICE O/P EST MOD 30 MIN: CPT

## 2024-08-09 PROCEDURE — 3077F SYST BP >= 140 MM HG: CPT

## 2024-08-09 RX ORDER — FLUTICASONE PROPIONATE 50 MCG
1 SPRAY, SUSPENSION (ML) NASAL DAILY
Qty: 16 G | Refills: 11 | Status: SHIPPED | OUTPATIENT
Start: 2024-08-09 | End: 2025-08-09

## 2024-08-09 RX ORDER — PREDNISONE 20 MG/1
40 TABLET ORAL DAILY
Qty: 10 TABLET | Refills: 0 | Status: SHIPPED | OUTPATIENT
Start: 2024-08-09 | End: 2024-08-14

## 2024-08-09 RX ORDER — LORATADINE 10 MG/1
10 TABLET ORAL DAILY
Qty: 30 TABLET | Refills: 11 | Status: SHIPPED | OUTPATIENT
Start: 2024-08-09 | End: 2025-08-09

## 2024-08-09 ASSESSMENT — PATIENT HEALTH QUESTIONNAIRE - PHQ9
SUM OF ALL RESPONSES TO PHQ9 QUESTIONS 1 AND 2: 0
2. FEELING DOWN, DEPRESSED OR HOPELESS: NOT AT ALL
1. LITTLE INTEREST OR PLEASURE IN DOING THINGS: NOT AT ALL

## 2024-08-09 ASSESSMENT — ENCOUNTER SYMPTOMS
COUGH: 1
BACK PAIN: 1
LOSS OF SENSATION IN FEET: 0
CONSTITUTIONAL NEGATIVE: 1
ARTHRALGIAS: 1
NECK PAIN: 1
DEPRESSION: 0
OCCASIONAL FEELINGS OF UNSTEADINESS: 0
SHORTNESS OF BREATH: 1

## 2024-08-09 ASSESSMENT — ANXIETY QUESTIONNAIRES
1. FEELING NERVOUS, ANXIOUS, OR ON EDGE: NOT AT ALL
7. FEELING AFRAID AS IF SOMETHING AWFUL MIGHT HAPPEN: NOT AT ALL
2. NOT BEING ABLE TO STOP OR CONTROL WORRYING: NOT AT ALL
5. BEING SO RESTLESS THAT IT IS HARD TO SIT STILL: NOT AT ALL
4. TROUBLE RELAXING: NOT AT ALL
6. BECOMING EASILY ANNOYED OR IRRITABLE: NOT AT ALL
GAD7 TOTAL SCORE: 0
3. WORRYING TOO MUCH ABOUT DIFFERENT THINGS: NOT AT ALL

## 2024-08-09 ASSESSMENT — PAIN SCALES - GENERAL: PAINLEVEL: 8

## 2024-08-09 NOTE — PROGRESS NOTES
"Subjective   Patient ID: Yadiel Basurto \"Al\" is a 63 y.o. male who presents for Follow-up (Left arm pain x 1 month back issue on CT) and Flu Symptoms (flu symptoms productive cough, headache, sore throat x 2 weeks no testing).    HPI 62 YO male here for ER follow up visit and flu like symptoms. States he was in the ED a couple of weeks ago for left shoulder and neck pain. States he completed imaging there but did not understand the results. He is here for clarification on them. He also reports flu like symptoms that has started about a week ago. States he smokes daily. Did not use any OTC medication for his shoulder, neck, or flu-like symptoms. Here for further eval.     Review of Systems   Constitutional: Negative.    Respiratory:  Positive for cough and shortness of breath.    Musculoskeletal:  Positive for arthralgias, back pain and neck pain.       Objective   /80 (BP Location: Right arm, Patient Position: Sitting, BP Cuff Size: Adult)   Pulse 76   Resp 18   Ht 1.702 m (5' 7\")   Wt 80 kg (176 lb 6.4 oz)   SpO2 97%   BMI 27.63 kg/m²     Physical Exam  Constitutional:       Appearance: Normal appearance.   Cardiovascular:      Rate and Rhythm: Normal rate.   Pulmonary:      Breath sounds: Decreased air movement present. Examination of the right-upper field reveals decreased breath sounds. Examination of the left-upper field reveals decreased breath sounds. Examination of the right-middle field reveals decreased breath sounds. Examination of the left-middle field reveals decreased breath sounds. Examination of the right-lower field reveals decreased breath sounds. Examination of the left-lower field reveals decreased breath sounds. Decreased breath sounds present.         Assessment/Plan   Problem List Items Addressed This Visit    None  Visit Diagnoses       Upper respiratory tract infection, unspecified type    -  Primary    Relevant Medications    predniSONE (Deltasone) 20 mg tablet    fluticasone " (Flonase) 50 mcg/actuation nasal spray    loratadine (Claritin) 10 mg tablet    Chronic left shoulder pain        Relevant Orders    Referral to Orthopaedic Surgery             Cough and SOB likely due to excessive smoking. Likely COPD exacerbation. Did offer the patient Xray to rule out cardiopulmonary processes but pt declined. States he is okay with OTC flonase, zyrtec, and prednisone 20mg BID for 5 days.     Ortho referral was placed. 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 OTC tylenol and motrin for pain and inflammation.     Follow up with PCP.

## 2024-08-16 ENCOUNTER — APPOINTMENT (OUTPATIENT)
Dept: PRIMARY CARE | Facility: CLINIC | Age: 64
End: 2024-08-16
Payer: COMMERCIAL

## 2024-08-22 ENCOUNTER — CLINICAL SUPPORT (OUTPATIENT)
Dept: NUTRITION | Facility: HOSPITAL | Age: 64
End: 2024-08-22
Payer: COMMERCIAL

## 2024-08-22 DIAGNOSIS — Z59.48 FOOD DEPRIVATION: ICD-10-CM

## 2024-08-22 SDOH — ECONOMIC STABILITY: FOOD INSECURITY: WITHIN THE PAST 12 MONTHS, YOU WORRIED THAT YOUR FOOD WOULD RUN OUT BEFORE YOU GOT MONEY TO BUY MORE.: OFTEN TRUE

## 2024-08-22 SDOH — ECONOMIC STABILITY - FOOD INSECURITY: OTHER SPECIFIED LACK OF ADEQUATE FOOD: Z59.48

## 2024-08-22 SDOH — ECONOMIC STABILITY: FOOD INSECURITY: WITHIN THE PAST 12 MONTHS, THE FOOD YOU BOUGHT JUST DIDN'T LAST AND YOU DIDN'T HAVE MONEY TO GET MORE.: OFTEN TRUE

## 2024-08-22 NOTE — PROGRESS NOTES
[facility]  Diet Recommendation 1: Soft (Chewing Difficulty)  Diet Recommendation 2: Calories, High  Nutrition Goals Stated: None  Household Size: 2 Family Members  Interventions: Referral Number: 5th 6 Mo Referral 2.5 yrs (Referrals may not be consecutive)  Interventions: Visit Number: 6 of 6 Visits - Max 6 Visits/Referral Each 6 Mo Period  Education Today: Healthy Eating on a Budget  Grains: 25-50% Whole  Fruit: % Fresh  Vegetables: % Fresh  Proteins: 1-2 Plant-based Items  Relevant [facility] Inpatient Discharge Items: Needs new referral  Originating Site of Referral Order: [facility]  Initials of RD Assisting Today: / BOBBY

## 2024-08-29 ENCOUNTER — OFFICE VISIT (OUTPATIENT)
Dept: ORTHOPEDIC SURGERY | Facility: HOSPITAL | Age: 64
End: 2024-08-29
Payer: COMMERCIAL

## 2024-08-29 VITALS — WEIGHT: 176 LBS | BODY MASS INDEX: 27.62 KG/M2 | HEIGHT: 67 IN

## 2024-08-29 DIAGNOSIS — M75.52 BURSITIS OF LEFT SHOULDER: ICD-10-CM

## 2024-08-29 DIAGNOSIS — M75.42 IMPINGEMENT SYNDROME OF LEFT SHOULDER: ICD-10-CM

## 2024-08-29 DIAGNOSIS — M75.82 ROTATOR CUFF TENDINITIS, LEFT: Primary | ICD-10-CM

## 2024-08-29 PROCEDURE — 20610 DRAIN/INJ JOINT/BURSA W/O US: CPT | Mod: LT | Performed by: ORTHOPAEDIC SURGERY

## 2024-08-29 PROCEDURE — 99214 OFFICE O/P EST MOD 30 MIN: CPT | Performed by: ORTHOPAEDIC SURGERY

## 2024-08-29 PROCEDURE — 3008F BODY MASS INDEX DOCD: CPT | Performed by: ORTHOPAEDIC SURGERY

## 2024-08-29 PROCEDURE — 2500000004 HC RX 250 GENERAL PHARMACY W/ HCPCS (ALT 636 FOR OP/ED): Performed by: ORTHOPAEDIC SURGERY

## 2024-08-29 PROCEDURE — 2500000005 HC RX 250 GENERAL PHARMACY W/O HCPCS: Performed by: ORTHOPAEDIC SURGERY

## 2024-08-29 RX ORDER — TRIAMCINOLONE ACETONIDE 40 MG/ML
40 INJECTION, SUSPENSION INTRA-ARTICULAR; INTRAMUSCULAR
Status: COMPLETED | OUTPATIENT
Start: 2024-08-29 | End: 2024-08-29

## 2024-08-29 RX ORDER — MELOXICAM 15 MG/1
15 TABLET ORAL DAILY
Qty: 30 TABLET | Refills: 2 | Status: SHIPPED | OUTPATIENT
Start: 2024-08-29 | End: 2025-08-29

## 2024-08-29 RX ORDER — LIDOCAINE HYDROCHLORIDE 10 MG/ML
4 INJECTION INFILTRATION; PERINEURAL
Status: COMPLETED | OUTPATIENT
Start: 2024-08-29 | End: 2024-08-29

## 2024-08-29 NOTE — PROGRESS NOTES
64 y.o. male presents today for evaluation of  left shoulder pain for about a month without injury. The patient reports  gradual onset of worsening pain. Pain is controlled. Patient reports no numbness and tingling.  Reports no previous surgeries, injections, or trauma to the area.  Reports pain worse with use, better at rest.   Pain dull ache, sharp at times.  has taken Advil with minimal relief.  Worse with overhead and behind the back activities.  Does smoke.    Review of Systems    Constitutional: see HPI, no fever, no chills, not feeling tired, no significant weight gain or weight loss.   Eyes: No vision changes  ENT: no nosebleeds.   Cardiovascular: no chest pain.   Respiratory: no shortness of breath and no cough.   Gastrointestinal: no abdominal pain, no nausea, no vomiting and no diarrhea.   Musculoskeletal: per HPI  Neurological: no headache, no gait disturbances  Psychiatric: no depression and no sleep disturbances.   Endocrine: no muscle weakness and no muscle cramps.   Hematologic/Lymphatic: no swollen glands and no tendency for easy bruising or excessive swelling.     Patient's past medical history, past surgical history, allergies, and medications have been reviewed unless otherwise noted in the chart.     Shoulder:  Inspection:  no evidence of infection, no erythema, no edema, no ecchymosis, Palpation:  compartments are soft  Skin:  intact, Vascular:  capillary refill <2 seconds distally, Sensation:  sensation intact to light touch distally; AROM- Abduction 90, elevation to 90 degrees, ER 90 degrees, IR   Limited S1;  TTP at the biceps tendon,  TTP at AC joint; TTP on the lateral deltoid Special- painful Cabezas test, Neer's Test, cross body test; painful Empty can test/Drop Arm test;  negative Yergason's/Speed's Test;   painful belly pressed and posterior liftoff     Constitutional   General appearance: Alert and in no acute distress. Well developed, well nourished.    Eyes   External Eye,  "Conjunctiva and lids: Normal external exam - pupils were equal in size, round, reactive to light (PERRL) with normal accommodation and extraocular movements intact (EOMI).   Ears, Nose, Mouth, and Throat   Hearing: Normal.   Neck   Neck: No neck mass was observed. Supple.   Pulmonary   Respiratory effort: No respiratory distress.   Cardiovascular   Examination of extremities: No peripheral edema.   Psychiatric   Judgment and insight: Intact.   Orientation to person, place, and time: Alert and oriented x 3.       Mood and affect: Normal.  N     left shoulder rotator cuff tendonitis, bursitis and impingement syndrome  I discussed the diagnosis and treatment options with the patient today along with their associated risks and benefits. After thorough discussion, the patient has elected to proceed with conservative management. All questions were answered to the patients satisfaction who seems satisfied with the plan.   Discussion I discussed the diagnosis and treatment options with the patient today along with their associated risks and benefits. After thorough discussion, the patient has elected to proceed with a corticosteroid injection with Kenalog and Xylocaine, which was performed in the office today under aseptic technique and the patient tolerated the procedure well.       Injection site  left shoulder (glenohumeral and subacromial) Medication 4 cc 2% Xylocaine, 1 cc 40 mg Kenalog, Injection given under sterile conditions. No immediate complications noted. Post injection instructions given.  Physical therapy for  left shoulder (ROM, strength, etc)  Mobic   x-ray of the shoulder on the way out  FU 6-8 weeks prn - No XR     Patient ID: Yadiel Basurto \"Al\" is a 64 y.o. male.    L Inj/Asp: L glenohumeral on 8/29/2024 8:41 AM  Indications: pain  Details: 22 G needle, posterior approach  Medications: 40 mg triamcinolone acetonide 40 mg/mL; 0.5 mL lidocaine 10 mg/mL (1 %)  Outcome: tolerated well, no immediate " complications  Procedure, treatment alternatives, risks and benefits explained, specific risks discussed. Consent was given by the patient. Immediately prior to procedure a time out was called to verify the correct patient, procedure, equipment, support staff and site/side marked as required. Patient was prepped and draped in the usual sterile fashion.

## 2024-08-29 NOTE — PROGRESS NOTES
NPV left shoulder pain referred by Dr.Emmanuel Fajardo   Patient states he has had ongoing pain for about a month ago he was seen at ED on 8/6/24 for pain states he does feel numbness when arm is dangling    no injury or surgery

## 2024-09-11 DIAGNOSIS — I25.118 CORONARY ARTERY DISEASE OF NATIVE ARTERY OF NATIVE HEART WITH STABLE ANGINA PECTORIS (CMS-HCC): ICD-10-CM

## 2024-09-11 DIAGNOSIS — J44.89 COPD (CHRONIC OBSTRUCTIVE PULMONARY DISEASE) WITH CHRONIC BRONCHITIS (MULTI): ICD-10-CM

## 2024-09-11 DIAGNOSIS — E78.5 DYSLIPIDEMIA, GOAL LDL BELOW 70: ICD-10-CM

## 2024-09-11 DIAGNOSIS — I10 BENIGN ESSENTIAL HYPERTENSION: ICD-10-CM

## 2024-09-11 DIAGNOSIS — I73.9 CLAUDICATION, INTERMITTENT (CMS-HCC): ICD-10-CM

## 2024-09-11 DIAGNOSIS — I73.9 PAD (PERIPHERAL ARTERY DISEASE) (CMS-HCC): ICD-10-CM

## 2024-09-11 DIAGNOSIS — I70.222 ATHEROSCLEROSIS OF NATIVE ARTERY OF LEFT LOWER EXTREMITY WITH REST PAIN (MULTI): ICD-10-CM

## 2024-09-11 DIAGNOSIS — Z23 FLU VACCINE NEED: ICD-10-CM

## 2024-09-11 DIAGNOSIS — F10.20 UNCOMPLICATED ALCOHOL DEPENDENCE (MULTI): ICD-10-CM

## 2024-09-11 DIAGNOSIS — F17.200 NEEDS SMOKING CESSATION EDUCATION: ICD-10-CM

## 2024-09-11 RX ORDER — CILOSTAZOL 100 MG/1
100 TABLET ORAL 2 TIMES DAILY
Qty: 60 TABLET | Refills: 11 | Status: SHIPPED | OUTPATIENT
Start: 2024-09-11

## 2024-09-11 RX ORDER — AMLODIPINE BESYLATE 10 MG/1
10 TABLET ORAL DAILY
Qty: 90 TABLET | Refills: 3 | Status: SHIPPED | OUTPATIENT
Start: 2024-09-11

## 2024-09-26 ENCOUNTER — CLINICAL SUPPORT (OUTPATIENT)
Dept: NUTRITION | Facility: HOSPITAL | Age: 64
End: 2024-09-26
Payer: COMMERCIAL

## 2024-09-26 ENCOUNTER — LAB (OUTPATIENT)
Dept: LAB | Facility: LAB | Age: 64
End: 2024-09-26
Payer: COMMERCIAL

## 2024-09-26 DIAGNOSIS — I25.118 CORONARY ARTERY DISEASE OF NATIVE ARTERY OF NATIVE HEART WITH STABLE ANGINA PECTORIS: ICD-10-CM

## 2024-09-26 DIAGNOSIS — I73.9 PAD (PERIPHERAL ARTERY DISEASE) (CMS-HCC): ICD-10-CM

## 2024-09-26 DIAGNOSIS — E78.5 DYSLIPIDEMIA, GOAL LDL BELOW 70: ICD-10-CM

## 2024-09-26 DIAGNOSIS — R73.02 IGT (IMPAIRED GLUCOSE TOLERANCE): ICD-10-CM

## 2024-09-26 DIAGNOSIS — J44.89 COPD (CHRONIC OBSTRUCTIVE PULMONARY DISEASE) WITH CHRONIC BRONCHITIS (MULTI): ICD-10-CM

## 2024-09-26 DIAGNOSIS — Z59.48 FOOD DEPRIVATION: ICD-10-CM

## 2024-09-26 LAB
CREAT UR-MCNC: 53.3 MG/DL (ref 20–370)
MICROALBUMIN UR-MCNC: <7 MG/L
MICROALBUMIN/CREAT UR: NORMAL MG/G{CREAT}

## 2024-09-26 PROCEDURE — 82043 UR ALBUMIN QUANTITATIVE: CPT

## 2024-09-26 PROCEDURE — 82570 ASSAY OF URINE CREATININE: CPT

## 2024-09-26 SDOH — ECONOMIC STABILITY - FOOD INSECURITY: OTHER SPECIFIED LACK OF ADEQUATE FOOD: Z59.48

## 2024-09-26 NOTE — PROGRESS NOTES
[facility]  Diet Recommendation 1: Soft (Chewing Difficulty)  Diet Recommendation 2: Calories, High  Food Intolerance Avoidance: NKFA  Household Size: 2 Family Members  Interventions: Referral Number: 4th 6 Mo Referral 2 yrs (Referrals may not be consecutive)  Interventions: Visit Number: 1 of 6 Visits - Max 6 Visits/Referral Each 6 Mo Period  Education Today: Healthy Eating on a Budget  Grains: 25-50% Whole  Fruit: Fresh - 100%  Vegetables: Fresh - 100%  Proteins: 4 or more Plant-based Items  Dairy: Lowfat - 100%  Originating Site of Referral Order: [facility]  Initials of RD Assisting Today: BOBBY

## 2024-09-27 ENCOUNTER — APPOINTMENT (OUTPATIENT)
Dept: PRIMARY CARE | Facility: CLINIC | Age: 64
End: 2024-09-27
Payer: COMMERCIAL

## 2024-09-27 VITALS
HEART RATE: 68 BPM | DIASTOLIC BLOOD PRESSURE: 64 MMHG | HEIGHT: 67 IN | BODY MASS INDEX: 27.31 KG/M2 | WEIGHT: 174 LBS | SYSTOLIC BLOOD PRESSURE: 112 MMHG

## 2024-09-27 DIAGNOSIS — J44.89 COPD (CHRONIC OBSTRUCTIVE PULMONARY DISEASE) WITH CHRONIC BRONCHITIS (MULTI): ICD-10-CM

## 2024-09-27 DIAGNOSIS — J01.01 ACUTE RECURRENT MAXILLARY SINUSITIS: Primary | ICD-10-CM

## 2024-09-27 DIAGNOSIS — R73.02 IGT (IMPAIRED GLUCOSE TOLERANCE): ICD-10-CM

## 2024-09-27 DIAGNOSIS — I73.9 PAD (PERIPHERAL ARTERY DISEASE) (CMS-HCC): ICD-10-CM

## 2024-09-27 DIAGNOSIS — Z23 FLU VACCINE NEED: ICD-10-CM

## 2024-09-27 DIAGNOSIS — F17.210 SMOKING GREATER THAN 40 PACK YEARS: ICD-10-CM

## 2024-09-27 DIAGNOSIS — E78.5 DYSLIPIDEMIA, GOAL LDL BELOW 70: ICD-10-CM

## 2024-09-27 DIAGNOSIS — I25.118 CORONARY ARTERY DISEASE OF NATIVE ARTERY OF NATIVE HEART WITH STABLE ANGINA PECTORIS: ICD-10-CM

## 2024-09-27 PROBLEM — R10.9 LEFT FLANK PAIN: Status: RESOLVED | Noted: 2024-04-20 | Resolved: 2024-09-27

## 2024-09-27 PROCEDURE — 3008F BODY MASS INDEX DOCD: CPT | Performed by: INTERNAL MEDICINE

## 2024-09-27 PROCEDURE — 99213 OFFICE O/P EST LOW 20 MIN: CPT | Performed by: INTERNAL MEDICINE

## 2024-09-27 PROCEDURE — 3074F SYST BP LT 130 MM HG: CPT | Performed by: INTERNAL MEDICINE

## 2024-09-27 PROCEDURE — 3078F DIAST BP <80 MM HG: CPT | Performed by: INTERNAL MEDICINE

## 2024-09-27 PROCEDURE — G0008 ADMIN INFLUENZA VIRUS VAC: HCPCS | Performed by: INTERNAL MEDICINE

## 2024-09-27 PROCEDURE — 90656 IIV3 VACC NO PRSV 0.5 ML IM: CPT | Performed by: INTERNAL MEDICINE

## 2024-09-27 PROCEDURE — 99406 BEHAV CHNG SMOKING 3-10 MIN: CPT | Performed by: INTERNAL MEDICINE

## 2024-09-27 RX ORDER — CEFDINIR 300 MG/1
300 CAPSULE ORAL 2 TIMES DAILY
Qty: 20 CAPSULE | Refills: 0 | Status: SHIPPED | OUTPATIENT
Start: 2024-09-27 | End: 2024-10-07

## 2024-09-27 RX ORDER — ALBUTEROL SULFATE 90 UG/1
2 INHALANT RESPIRATORY (INHALATION) EVERY 4 HOURS PRN
Qty: 18 G | Refills: 3 | Status: SHIPPED | OUTPATIENT
Start: 2024-09-27 | End: 2025-09-27

## 2024-09-27 NOTE — ASSESSMENT & PLAN NOTE
LDL cholesterol less than 70 continue atorvastatin 80 mg 1 tablet daily reevaluate with blood work  Orders:    [facility] - PCP - Established    Follow Up [facility] - PCP - Established; Future

## 2024-09-27 NOTE — PROGRESS NOTES
"Subjective   Patient ID: Raoul Basurto is a 64 y.o. male who presents for Follow-up.    HPI     Review of Systems    Objective   /64   Pulse 68   Ht 1.702 m (5' 7\")   Wt 78.9 kg (174 lb)   BMI 27.25 kg/m²     Physical Exam    Assessment/Plan          "

## 2024-09-27 NOTE — ASSESSMENT & PLAN NOTE
ContinueAdvair discus 250/51 elation twice a day continue working on smoking cessation counseling given  Orders:    Follow [facility] - PCP - Established    albuterol 90 mcg/actuation inhaler; Inhale 2 puffs every 4 hours if needed for wheezing or shortness of breath. Proair inhaler    Follow Up [facility] - PCP - Established; Future

## 2024-09-27 NOTE — ASSESSMENT & PLAN NOTE
Check fasting lab work to evaluate impaired sugars  Orders:    Follow Up [facility] - PCP - Established    Follow Up [facility] - PCP - Established; Future

## 2024-09-27 NOTE — ASSESSMENT & PLAN NOTE
Updated high-dose influenza vaccine  Orders:    Flu vaccine, trivalent, preservative free, age 6 months and greater (Fluarix/Fluzone/Flulaval)

## 2024-09-27 NOTE — ASSESSMENT & PLAN NOTE
No signs of ischemic claudication at this time continue cilostazol 100 mg twice a day work on smoking cessation continue aspirin 81 mg daily with clopidogrel 75 mg daily  Orders:    Follow [facility] - PCP - Established    Follow [facility] - PCP - Established; Future

## 2024-09-27 NOTE — ASSESSMENT & PLAN NOTE
Work on smoking cessation update of vaccinations  Orders:    albuterol 90 mcg/actuation inhaler; Inhale 2 puffs every 4 hours if needed for wheezing or shortness of breath. Proair inhaler

## 2024-09-27 NOTE — PROGRESS NOTES
"Subjective   Reason for Visit: Yadiel Basurto is an 64 y.o. male here for a Medicare Wellness visit.     Past Medical, Surgical, and Family History reviewed and updated in chart.    Reviewed all medications by prescribing practitioner or clinical pharmacist (such as prescriptions, OTCs, herbal therapies and supplements) and documented in the medical record.  I spent more than 3 minutes but less than 10 minutes counseling patient about need for smoking/tobacco cessation and how I can get support efforts when patient is ready to quit.  Discussed nicotine replacement therapy such as bupropion nicotine replacement therapy and Chantix .  Hypnosis,support groups,and acupuncture are other potential options.  HPI    Patient Care Team:  Link Fajardo DO as PCP - General  Link Fajardo DO as PCP - [facility] PCP     Review of Systems   All other systems reviewed and are negative.      Objective   Vitals:  /64   Pulse 68   Ht 1.702 m (5' 7\")   Wt 78.9 kg (174 lb)   BMI 27.25 kg/m²       Physical Exam  Vitals and nursing note reviewed.   Constitutional:       General: He is not in acute distress.     Appearance: Normal appearance. He is well-developed. He is not toxic-appearing.   HENT:      Head: Normocephalic and atraumatic.      Right Ear: Tympanic membrane and external ear normal.      Left Ear: Tympanic membrane and external ear normal.      Nose: Nose normal.      Mouth/Throat:      Mouth: Mucous membranes are moist.      Pharynx: Oropharynx is clear. No oropharyngeal exudate or posterior oropharyngeal erythema.      Tonsils: No tonsillar exudate. 2+ on the right. 2+ on the left.   Eyes:      Extraocular Movements: Extraocular movements intact.      Conjunctiva/sclera: Conjunctivae normal.   Cardiovascular:      Rate and Rhythm: Normal rate and regular rhythm.      Pulses: Normal pulses.      Heart sounds: Normal heart sounds. No murmur heard.  Pulmonary:      Effort: " Pulmonary effort is normal.      Breath sounds: Normal breath sounds.   Abdominal:      General: Abdomen is flat. Bowel sounds are normal.      Palpations: Abdomen is soft.   Musculoskeletal:      Cervical back: Neck supple.   Feet:      Right foot:      Skin integrity: Skin integrity normal. No ulcer, blister, skin breakdown, erythema, warmth or callus.      Toenail Condition: Right toenails are normal.      Left foot:      Skin integrity: Skin integrity normal. No ulcer, blister, skin breakdown, erythema, warmth or callus.      Toenail Condition: Left toenails are normal.   Lymphadenopathy:      Cervical: No cervical adenopathy.   Skin:     General: Skin is warm and dry.      Capillary Refill: Capillary refill takes more than 3 seconds.      Findings: No rash.   Neurological:      Mental Status: He is alert. Mental status is at baseline.      Sensory: Sensation is intact.   Psychiatric:         Mood and Affect: Mood normal.         Behavior: Behavior normal.         Thought Content: Thought content normal.         Judgment: Judgment normal.     Lifestyle Recommendations  I recommend a whole-food plant-based diet, an eating pattern that encourages the consumption of unrefined plant foods (such as fruits, vegetables, tubers, whole grains, legumes, nuts and seeds) and discourages meats, dairy products, eggs and processed foods.     The AHA/ACC recommends that the patient consume a dietary pattern that emphasizes intake of vegetables, fruits, and whole grains; includes low-fat dairy products, poultry, fish, legumes, non-tropical vegetable oils, and nuts; and limits intake of sodium, sweets, sugar-sweetened beverages, and red meats.  Adapt this dietary pattern to appropriate calorie requirements (a 500-750 kcal/day deficit to loose weight), personal and cultural food preferences, and nutrition therapy for other medical conditions (including diabetes).  Achieve this pattern by following plans such as the [place]  Mediterranean, DASH dietary pattern, or AHA diet.     Engage in 2 hours and 30 minutes per week of moderate-intensity physical activity, or 1 hour and 15 minutes (75 minutes) per week of vigorous-intensity aerobic physical activity, or an equivalent combination of moderate and vigorous-intensity aerobic physical activity. Aerobic activity should be performed in episodes of at least 10 minutes preferably spread throughout the week.     Adhering to a heart healthy diet, regular exercise habits, avoidance of tobacco products, and maintenance of a healthy weight are crucial components of their heart disease risk reduction.    Assessment & Plan  COPD (chronic obstructive pulmonary disease) with chronic bronchitis (Multi)  ContinueAdvair discus 250/51 elation twice a day continue working on smoking cessation counseling given  Orders:    Follow [facility] - PCP - Established    albuterol 90 mcg/actuation inhaler; Inhale 2 puffs every 4 hours if needed for wheezing or shortness of breath. Proair inhaler    Follow Up [facility] - PCP - Established; Future    Coronary artery disease of native artery of native heart with stable angina pectoris  Continue amlodipine 10 mg daily with isosorbide mononitrate 30 mg daily and metoprolol tartrate 50 mg twice a day  Orders:    Follow [facility] - PCP - Established    Follow Up [facility] - PCP - Established; Future    PAD (peripheral artery disease) (CMS-[facility])  No signs of ischemic claudication at this time continue cilostazol 100 mg twice a day work on smoking cessation continue aspirin 81 mg daily with clopidogrel 75 mg daily  Orders:    Follow [facility] - PCP - Established    Follow [facility] - PCP - Established; Future    Dyslipidemia, goal LDL below 70  LDL cholesterol less than 70 continue atorvastatin 80 mg 1 tablet daily reevaluate with blood work  Orders:    [facility] - PCP - Established    Follow Up [facility] - PCP - Established; Future    IGT (impaired glucose tolerance)  Check fasting lab work to evaluate impaired sugars  Orders:    Follow Up [facility] - PCP - Established    Follow [facility] - PCP - Established; Future    Flu vaccine need  Updated high-dose influenza vaccine  Orders:    Flu vaccine, trivalent, preservative free, age 6 months and greater (Fluarix/Fluzone/Flulaval)    Acute recurrent maxillary sinusitis  Acute treatment with cefdinir 300 mg 1 tablet twice a day for 10 days patient with penicillin allergy    Orders:    cefdinir (Omnicef) 300 mg capsule; Take 1 capsule (300 mg) by mouth 2 times a day for 10 days.    Follow Up [facility] - PCP - Established; Future    Smoking greater than 40 pack years  Work on smoking cessation update of vaccinations  Orders:    albuterol 90 mcg/actuation inhaler; Inhale 2 puffs every 4 hours if needed for wheezing or shortness of breath. Proair inhaler

## 2024-09-27 NOTE — ASSESSMENT & PLAN NOTE
Acute treatment with cefdinir 300 mg 1 tablet twice a day for 10 days patient with penicillin allergy    Orders:    cefdinir (Omnicef) 300 mg capsule; Take 1 capsule (300 mg) by mouth 2 times a day for 10 days.    Follow Up [facility] - PCP - Established; Future

## 2024-10-01 ENCOUNTER — APPOINTMENT (OUTPATIENT)
Dept: RADIOLOGY | Facility: HOSPITAL | Age: 64
End: 2024-10-01
Payer: COMMERCIAL

## 2024-10-31 ENCOUNTER — APPOINTMENT (OUTPATIENT)
Dept: NUTRITION | Facility: HOSPITAL | Age: 64
End: 2024-10-31
Payer: COMMERCIAL

## 2025-01-03 ENCOUNTER — APPOINTMENT (OUTPATIENT)
Dept: PRIMARY CARE | Facility: CLINIC | Age: 65
End: 2025-01-03
Payer: COMMERCIAL

## 2025-01-03 VITALS
BODY MASS INDEX: 25.9 KG/M2 | HEART RATE: 74 BPM | SYSTOLIC BLOOD PRESSURE: 118 MMHG | WEIGHT: 165 LBS | DIASTOLIC BLOOD PRESSURE: 60 MMHG | HEIGHT: 67 IN

## 2025-01-03 DIAGNOSIS — Z00.00 MEDICARE ANNUAL WELLNESS VISIT, SUBSEQUENT: ICD-10-CM

## 2025-01-03 DIAGNOSIS — I11.9 HYPERTENSIVE HEART DISEASE WITHOUT HEART FAILURE: ICD-10-CM

## 2025-01-03 DIAGNOSIS — Z12.11 COLON CANCER SCREENING: ICD-10-CM

## 2025-01-03 DIAGNOSIS — E55.9 VITAMIN D DEFICIENCY: ICD-10-CM

## 2025-01-03 DIAGNOSIS — J44.89 COPD (CHRONIC OBSTRUCTIVE PULMONARY DISEASE) WITH CHRONIC BRONCHITIS (MULTI): ICD-10-CM

## 2025-01-03 DIAGNOSIS — R73.02 IGT (IMPAIRED GLUCOSE TOLERANCE): ICD-10-CM

## 2025-01-03 DIAGNOSIS — I25.118 CORONARY ARTERY DISEASE OF NATIVE ARTERY OF NATIVE HEART WITH STABLE ANGINA PECTORIS: ICD-10-CM

## 2025-01-03 DIAGNOSIS — F17.200 TOBACCO DEPENDENCY: ICD-10-CM

## 2025-01-03 DIAGNOSIS — F10.20 UNCOMPLICATED ALCOHOL DEPENDENCE (MULTI): ICD-10-CM

## 2025-01-03 DIAGNOSIS — F17.210 SMOKING GREATER THAN 40 PACK YEARS: ICD-10-CM

## 2025-01-03 DIAGNOSIS — E78.5 DYSLIPIDEMIA, GOAL LDL BELOW 70: ICD-10-CM

## 2025-01-03 DIAGNOSIS — E53.8 VITAMIN B12 DEFICIENCY: ICD-10-CM

## 2025-01-03 DIAGNOSIS — Z00.00 ROUTINE GENERAL MEDICAL EXAMINATION AT HEALTH CARE FACILITY: Primary | ICD-10-CM

## 2025-01-03 DIAGNOSIS — I73.9 PAD (PERIPHERAL ARTERY DISEASE) (CMS-HCC): ICD-10-CM

## 2025-01-03 PROBLEM — J01.01 ACUTE RECURRENT MAXILLARY SINUSITIS: Status: RESOLVED | Noted: 2023-10-06 | Resolved: 2025-01-03

## 2025-01-03 PROBLEM — I10 BENIGN ESSENTIAL HYPERTENSION: Status: RESOLVED | Noted: 2022-06-20 | Resolved: 2025-01-03

## 2025-01-03 RX ORDER — BUDESONIDE AND FORMOTEROL FUMARATE DIHYDRATE 160; 4.5 UG/1; UG/1
2 AEROSOL RESPIRATORY (INHALATION)
Qty: 10.2 G | Refills: 5 | Status: SHIPPED | OUTPATIENT
Start: 2025-01-03 | End: 2026-01-03

## 2025-01-03 ASSESSMENT — ANXIETY QUESTIONNAIRES
GAD7 TOTAL SCORE: 0
1. FEELING NERVOUS, ANXIOUS, OR ON EDGE: NOT AT ALL
3. WORRYING TOO MUCH ABOUT DIFFERENT THINGS: NOT AT ALL
IF YOU CHECKED OFF ANY PROBLEMS ON THIS QUESTIONNAIRE, HOW DIFFICULT HAVE THESE PROBLEMS MADE IT FOR YOU TO DO YOUR WORK, TAKE CARE OF THINGS AT HOME, OR GET ALONG WITH OTHER PEOPLE: NOT DIFFICULT AT ALL
6. BECOMING EASILY ANNOYED OR IRRITABLE: NOT AT ALL
2. NOT BEING ABLE TO STOP OR CONTROL WORRYING: NOT AT ALL
4. TROUBLE RELAXING: NOT AT ALL
7. FEELING AFRAID AS IF SOMETHING AWFUL MIGHT HAPPEN: NOT AT ALL
5. BEING SO RESTLESS THAT IT IS HARD TO SIT STILL: NOT AT ALL

## 2025-01-03 ASSESSMENT — PATIENT HEALTH QUESTIONNAIRE - PHQ9
2. FEELING DOWN, DEPRESSED OR HOPELESS: NOT AT ALL
SUM OF ALL RESPONSES TO PHQ9 QUESTIONS 1 AND 2: 0
1. LITTLE INTEREST OR PLEASURE IN DOING THINGS: NOT AT ALL

## 2025-01-03 ASSESSMENT — ACTIVITIES OF DAILY LIVING (ADL)
GROCERY_SHOPPING: INDEPENDENT
TAKING_MEDICATION: INDEPENDENT
DRESSING: INDEPENDENT
DOING_HOUSEWORK: INDEPENDENT
BATHING: INDEPENDENT
MANAGING_FINANCES: INDEPENDENT

## 2025-01-03 ASSESSMENT — ENCOUNTER SYMPTOMS
OCCASIONAL FEELINGS OF UNSTEADINESS: 0
LOSS OF SENSATION IN FEET: 0
DEPRESSION: 0

## 2025-01-03 NOTE — ASSESSMENT & PLAN NOTE
Reevaluate fasting blood work  Orders:    Follow Up [facility] - PCP - Established    Comprehensive Metabolic Panel; Future    Hemoglobin A1C; Future    Lipid Panel; Future    Albumin-Creatinine Ratio, Urine Random; Future    CBC and Auto Differential; Future    CT lung screening low dose; Future    Follow Up [facility] - PCP - Established; Future    Vitamin B12; Future    Vitamin D 25-Hydroxy,Total (for eval of Vitamin D levels); Future    budesonide-formoteroL (Symbicort) 160-4.5 mcg/actuation inhaler; Inhale 2 puffs 2 times a day. Rinse mouth with water after use to reduce aftertaste and incidence of candidiasis. Do not swallow.

## 2025-01-03 NOTE — PROGRESS NOTES
"Subjective   Reason for Visit: Yadiel Basurto is an 64 y.o. male here for a Medicare Wellness visit.          Reviewed all medications by prescribing practitioner or clinical pharmacist (such as prescriptions, OTCs, herbal therapies and supplements) and documented in the medical record.    HPI    Patient Care Team:  Link Fajardo DO as PCP - General  Link Fajardo DO as PCP - [facility] PCP     Review of Systems    Objective   Vitals:  /60   Pulse 74   Ht 1.702 m (5' 7\")   Wt 74.8 kg (165 lb)   BMI 25.84 kg/m²       Physical Exam    Assessment & Plan  COPD (chronic obstructive pulmonary disease) with chronic bronchitis (Multi)    Orders:    Follow Up [facility] - PCP - Established    Coronary artery disease of native artery of native heart with stable angina pectoris    Orders:    Follow Up [facility] - PCP - Established    PAD (peripheral artery disease) (CMS-[facility])    Orders:    Follow Up [facility] - PCP - Established    Dyslipidemia, goal LDL below 70    Orders:    Follow Up [facility] - PCP - Established    IGT (impaired glucose tolerance)    Orders:    Follow Up [facility] - PCP - Established    Acute recurrent maxillary sinusitis    Orders:    Follow Up [facility] - PCP - Established              "

## 2025-01-03 NOTE — ASSESSMENT & PLAN NOTE
Continue to follow with cardiologist denies claudication symptoms at rest aggressive management with smoking cessation and exercise therapy as tolerated  Orders:    Follow Up [facility] - PCP - Established    Comprehensive Metabolic Panel; Future    Hemoglobin A1C; Future    Lipid Panel; Future    Albumin-Creatinine Ratio, Urine Random; Future    CBC and Auto Differential; Future    CT lung screening low dose; Future    Follow Up [facility] - PCP - Established; Future    Vitamin B12; Future    Vitamin D 25-Hydroxy,Total (for eval of Vitamin D levels); Future    budesonide-formoteroL (Symbicort) 160-4.5 mcg/actuation inhaler; Inhale 2 puffs 2 times a day. Rinse mouth with water after use to reduce aftertaste and incidence of candidiasis. Do not swallow.

## 2025-01-03 NOTE — ASSESSMENT & PLAN NOTE
Repeat vitamin B12 levels in the setting of chronic alcohol use and risk for neuropathy  Orders:    Vitamin B12; Future

## 2025-01-03 NOTE — ASSESSMENT & PLAN NOTE
Scheduled lung cancer screening test to be reevaluated from evaluation of 2023  Orders:    CT lung screening low dose; Future

## 2025-01-03 NOTE — ASSESSMENT & PLAN NOTE
Check vitamin D levels in the setting of risk of falls and depletion  Orders:    Vitamin D 25-Hydroxy,Total (for eval of Vitamin D levels); Future

## 2025-01-03 NOTE — PROGRESS NOTES
"Subjective   Reason for Visit: Yadiel Basurto is an 64 y.o. male here for a Medicare Wellness visit.     Past Medical, Surgical, and Family History reviewed and updated in chart.    Reviewed all medications by prescribing practitioner or clinical pharmacist (such as prescriptions, OTCs, herbal therapies and supplements) and documented in the medical record.    [facility]    Patient Care Team:  Link Fajardo DO as PCP - General  Link Fajardo DO as PCP - [facility] [employer] PCP     Review of Systems   All other systems reviewed and are negative.      Objective   Vitals:  /60   Pulse 74   Ht 1.702 m (5' 7\")   Wt 74.8 kg (165 lb)   BMI 25.84 kg/m²       Physical Exam  Vitals and nursing note reviewed.   Constitutional:       General: He is not in acute distress.     Appearance: Normal appearance. He is well-developed. He is not toxic-appearing.   HENT:      Head: Normocephalic and atraumatic.      Right Ear: Tympanic membrane and external ear normal.      Left Ear: Tympanic membrane and external ear normal.      Nose: Nose normal.      Mouth/Throat:      Mouth: Mucous membranes are moist.      Pharynx: Oropharynx is clear. No oropharyngeal exudate or posterior oropharyngeal erythema.      Tonsils: No tonsillar exudate. 2+ on the right. 2+ on the left.   Eyes:      Extraocular Movements: Extraocular movements intact.      Conjunctiva/sclera: Conjunctivae normal.   Cardiovascular:      Rate and Rhythm: Normal rate and regular rhythm.      Pulses: Normal pulses.      Heart sounds: Normal heart sounds. No murmur heard.  Pulmonary:      Effort: Pulmonary effort is normal.      Breath sounds: Rhonchi present.   Abdominal:      General: Abdomen is flat. Bowel sounds are normal.      Palpations: Abdomen is soft.   Musculoskeletal:      Cervical back: Neck supple.   Feet:      Right foot:      Skin integrity: Skin integrity normal. No ulcer, blister, skin breakdown, erythema, warmth or callus.      " Toenail Condition: Right toenails are normal.      Left foot:      Skin integrity: Skin integrity normal. No ulcer, blister, skin breakdown, erythema, warmth or callus.      Toenail Condition: Left toenails are normal.   Lymphadenopathy:      Cervical: No cervical adenopathy.   Skin:     General: Skin is warm and dry.      Capillary Refill: Capillary refill takes more than 3 seconds.      Findings: No rash.   Neurological:      Mental Status: He is alert. Mental status is at baseline.      Sensory: Sensation is intact.   Psychiatric:         Mood and Affect: Mood normal.         Behavior: Behavior normal.         Thought Content: Thought content normal.         Judgment: Judgment normal.     Lifestyle Recommendations  I recommend a whole-food plant-based diet, an eating pattern that encourages the consumption of unrefined plant foods (such as fruits, vegetables, tubers, whole grains, legumes, nuts and seeds) and discourages meats, dairy products, eggs and processed foods.     The AHA/ACC recommends that the patient consume a dietary pattern that emphasizes intake of vegetables, fruits, and whole grains; includes low-fat dairy products, poultry, fish, legumes, non-tropical vegetable oils, and nuts; and limits intake of sodium, sweets, sugar-sweetened beverages, and red meats.  Adapt this dietary pattern to appropriate calorie requirements (a 500-750 kcal/day deficit to loose weight), personal and cultural food preferences, and nutrition therapy for other medical conditions (including diabetes).  Achieve this pattern by following plans such as the [place] Mediterranean, DASH dietary pattern, or AHA diet.     Engage in 2 hours and 30 minutes per week of moderate-intensity physical activity, or 1 hour and 15 minutes (75 minutes) per week of vigorous-intensity aerobic physical activity, or an equivalent combination of moderate and vigorous-intensity aerobic physical activity. Aerobic activity should be performed in  episodes of at least 10 minutes preferably spread throughout the week.     Adhering to a heart healthy diet, regular exercise habits, avoidance of tobacco products, and maintenance of a healthy weight are crucial components of their heart disease risk reduction.    Assessment & Plan  COPD (chronic obstructive pulmonary disease) with chronic bronchitis (Multi)  Patient will like to switch from Wixela to Symbicort 160/4.52 puffs twice a day as this was more effective for him continue to work aggressively on smoking cessation smoking about 1 pack of cigarettes a day  Orders:    Follow Up In [facility] - PCP - Established    Coronary artery disease of native artery of native heart with stable angina pectoris  Continue with aggressive secondary risk factor management smoking cessation  Orders:    Follow Up In [facility] - PCP - Established    PAD (peripheral artery disease) (CMS-HCC)  Continue to follow with cardiologist denies claudication symptoms at rest aggressive management with smoking cessation and exercise therapy as tolerated  Orders:    Follow Up In [facility] - PCP - Established    Comprehensive Metabolic Panel; Future    Hemoglobin A1C; Future    Lipid Panel; Future    Albumin-Creatinine Ratio, Urine Random; Future    CBC and Auto Differential; Future    CT lung screening low dose; Future    Follow Up In [facility] - PCP - Established; Future    Vitamin B12; Future    Vitamin D 25-Hydroxy,Total (for eval of Vitamin D levels); Future    budesonide-formoteroL (Symbicort) 160-4.5 mcg/actuation inhaler; Inhale 2 puffs 2 times a day. Rinse mouth with water after use to reduce aftertaste and incidence of candidiasis. Do not swallow.    Dyslipidemia, goal LDL below 70  Reevaluate LDL cholesterol on maximal dose atorvastatin 80 mg daily with LDL goal less than 70  Orders:    Follow Up In [facility] - PCP - Established    Comprehensive Metabolic Panel; Future     Hemoglobin A1C; Future    Lipid Panel; Future    Albumin-Creatinine Ratio, Urine Random; Future    CBC and Auto Differential; Future    CT lung screening low dose; Future    Follow Up In [facility] - PCP - Established; Future    Vitamin B12; Future    Vitamin D 25-Hydroxy,Total (for eval of Vitamin D levels); Future    budesonide-formoteroL (Symbicort) 160-4.5 mcg/actuation inhaler; Inhale 2 puffs 2 times a day. Rinse mouth with water after use to reduce aftertaste and incidence of candidiasis. Do not swallow.    IGT (impaired glucose tolerance)  Reevaluate fasting blood work  Orders:    Follow Up In [facility] - PCP - Established    Comprehensive Metabolic Panel; Future    Hemoglobin A1C; Future    Lipid Panel; Future    Albumin-Creatinine Ratio, Urine Random; Future    CBC and Auto Differential; Future    CT lung screening low dose; Future    Follow Up In [facility] - PCP - Established; Future    Vitamin B12; Future    Vitamin D 25-Hydroxy,Total (for eval of Vitamin D levels); Future    budesonide-formoteroL (Symbicort) 160-4.5 mcg/actuation inhaler; Inhale 2 puffs 2 times a day. Rinse mouth with water after use to reduce aftertaste and incidence of candidiasis. Do not swallow.    Routine general medical examination at health care facility  Discussed importance of vaccination and screenings  Orders:    1 Year Follow Up In [facility] - PCP - Wellness Exam; Future    Tobacco dependency  Currently smoking 1 pack of cigarettes a day discussed and ordered lung cancer screening       Medicare annual wellness visit, subsequent  Strongly urged patient to get vaccines covered particularly booster with COVID-19 and RSV has received influenza and pneumococcal vaccines       Vitamin B12 deficiency  Repeat vitamin B12 levels in the setting of chronic alcohol use and risk for neuropathy  Orders:    Vitamin B12; Future    Vitamin D deficiency  Check vitamin D levels in the setting of  risk of falls and depletion  Orders:    Vitamin D 25-Hydroxy,Total (for eval of Vitamin D levels); Future    Hypertensive heart disease without heart failure  Well compensated at this time on losartan 50 mg twice a day continue baby aspirin 81 mg daily with clopidogrel 75 mg daily in the setting of severe peripheral vascular disease with ongoing risk       Colon cancer screening  Repeat Cologuard screening to be completed before next evaluation       Uncomplicated alcohol dependence (Multi)  Patient drinking 2 beers a day encourage abstinence       Smoking greater than 40 pack years  Scheduled lung cancer screening test to be reevaluated from evaluation of 2023  Orders:    CT lung screening low dose; Future           Advance Directives Discussion  16 - 20 minutes were spent discussing Advanced Care Planning (including a Living Will, Medical Power Of , as well as specific end of life choices and/or directives). The details of that discussion were documented in Advanced Directives Discussion section of the medical record.     Cardiac Risk Assessment  15 - 20 minutes were spent discussing Cardiovascular risk and, if needed, lifestyle modifications were recommended, including nutritional choices, exercise, and elimination of habits contributing to risk.   Aspirin use/disuse was discussed following the guidelines below:  low dose ASA ( mg) should be considered:    If prior Heart Attack/Stroke/Peripheral vascular disease:  Generally recommend daily low dose aspirin unless extremely high bleeding risk (e.g., gastrointestinal).    If no prior Heart Attack/Stroke/Peripheral vascular disease:              Age over 70: Do not use Aspirin for prevention    Age less than 70 and 10-year cardiovascular disease risk is >20%: use low dose Aspirin for prevention.                 Depression Screening  5 - 10 minutes were spent screening for depression.    Low Dose CT Screening  We discussed the pros and cons of low dose  CT screening for lung cancer based on the patient's smoking history.  This screening is recommended for patients who:  Are between the age of 50 to 77  Have a 20 pack-year smoking history (20 years of smoking a pack a day)  Are either a current smoker or have quit smoking within the last 15 years  Are in good health - no new cough or unexplained weight loss  Are willing to do the follow-up testing and treatment, if needed  Have not had a chest CT (CAT) scan in the last year    Tobacco Counseling  5 - 10 minutes were spent counseling the patient on tobacco cessation.  Benefits of cessation were discussed as well as techniques to help quit.      Tobacco Counseling  The patient smokes cigarettes and desires to quit.  We created the following quit plan:  Reviewed STAR protocol and added to patient instructions.

## 2025-01-03 NOTE — ASSESSMENT & PLAN NOTE
Well compensated at this time on losartan 50 mg twice a day continue baby aspirin 81 mg daily with clopidogrel 75 mg daily in the setting of severe peripheral vascular disease with ongoing risk

## 2025-01-03 NOTE — ASSESSMENT & PLAN NOTE
Reevaluate LDL cholesterol on maximal dose atorvastatin 80 mg daily with LDL goal less than 70  Orders:    Follow Up In [facility] - PCP - Established    Comprehensive Metabolic Panel; Future    Hemoglobin A1C; Future    Lipid Panel; Future    Albumin-Creatinine Ratio, Urine Random; Future    CBC and Auto Differential; Future    CT lung screening low dose; Future    Follow Up In [facility] - PCP - Established; Future    Vitamin B12; Future    Vitamin D 25-Hydroxy,Total (for eval of Vitamin D levels); Future    budesonide-formoteroL (Symbicort) 160-4.5 mcg/actuation inhaler; Inhale 2 puffs 2 times a day. Rinse mouth with water after use to reduce aftertaste and incidence of candidiasis. Do not swallow.

## 2025-01-03 NOTE — ASSESSMENT & PLAN NOTE
Patient will like to switch from Wixela to Symbicort 160/4.52 puffs twice a day as this was more effective for him continue to work aggressively on smoking cessation smoking about 1 pack of cigarettes a day  Orders:    Follow Up In [facility] - PCP - Established

## 2025-01-03 NOTE — PATIENT INSTRUCTIONS
Quitting Smoking    Quitting smoking is the most important step you can take to improve your health. We're glad you have set a goal to improve your health.    Quit Smoking Resources    In addition to medications, use the STAR plan to help you successfully quit.   Stick with your quit date!   Tell friends, family, and coworkers your quit date. Request their understanding and support.  Anticipate and prepare for challenges. Some examples are withdrawal symptoms, being around others who smoke, and drinking alcohol.  Remove all tobacco products and paraphernalia from your environment. Make your home and vehicles smoke-free.    Free resources for additional support:  National tobacco quitline: 1-800-QUIT-NOW (1-129.688.9982).  SmokefreeTXT is a free text program to assist you in quitting. Visit https://www.smokefree.gov/smokefreetxt for more information.  Feel free to call your care manager at ([phone]) for additional support.

## 2025-01-03 NOTE — ASSESSMENT & PLAN NOTE
Strongly urged patient to get vaccines covered particularly booster with COVID-19 and RSV has received influenza and pneumococcal vaccines

## 2025-01-20 DIAGNOSIS — E78.5 DYSLIPIDEMIA, GOAL LDL BELOW 70: ICD-10-CM

## 2025-01-20 DIAGNOSIS — J44.89 COPD (CHRONIC OBSTRUCTIVE PULMONARY DISEASE) WITH CHRONIC BRONCHITIS (MULTI): ICD-10-CM

## 2025-01-20 DIAGNOSIS — I73.9 PAD (PERIPHERAL ARTERY DISEASE) (CMS-HCC): ICD-10-CM

## 2025-01-20 DIAGNOSIS — R73.02 IGT (IMPAIRED GLUCOSE TOLERANCE): ICD-10-CM

## 2025-01-20 DIAGNOSIS — I25.118 CORONARY ARTERY DISEASE OF NATIVE ARTERY OF NATIVE HEART WITH STABLE ANGINA PECTORIS: ICD-10-CM

## 2025-01-20 RX ORDER — TAMSULOSIN HYDROCHLORIDE 0.4 MG/1
0.4 CAPSULE ORAL DAILY
Qty: 30 CAPSULE | Refills: 11 | Status: SHIPPED | OUTPATIENT
Start: 2025-01-20

## 2025-01-22 ENCOUNTER — LAB (OUTPATIENT)
Dept: LAB | Facility: LAB | Age: 65
End: 2025-01-22
Payer: COMMERCIAL

## 2025-01-22 ENCOUNTER — HOSPITAL ENCOUNTER (OUTPATIENT)
Dept: RADIOLOGY | Facility: CLINIC | Age: 65
Discharge: HOME | End: 2025-01-22
Payer: COMMERCIAL

## 2025-01-22 DIAGNOSIS — R73.02 IGT (IMPAIRED GLUCOSE TOLERANCE): ICD-10-CM

## 2025-01-22 DIAGNOSIS — I73.9 PAD (PERIPHERAL ARTERY DISEASE) (CMS-HCC): ICD-10-CM

## 2025-01-22 DIAGNOSIS — E53.8 VITAMIN B12 DEFICIENCY: ICD-10-CM

## 2025-01-22 DIAGNOSIS — E55.9 VITAMIN D DEFICIENCY: ICD-10-CM

## 2025-01-22 DIAGNOSIS — F17.210 SMOKING GREATER THAN 40 PACK YEARS: ICD-10-CM

## 2025-01-22 DIAGNOSIS — E78.5 DYSLIPIDEMIA, GOAL LDL BELOW 70: ICD-10-CM

## 2025-01-22 LAB
25(OH)D3 SERPL-MCNC: 45 NG/ML (ref 30–100)
ALBUMIN SERPL BCP-MCNC: 3.9 G/DL (ref 3.4–5)
ALP SERPL-CCNC: 137 U/L (ref 33–136)
ALT SERPL W P-5'-P-CCNC: 15 U/L (ref 10–52)
ANION GAP SERPL CALC-SCNC: 12 MMOL/L (ref 10–20)
AST SERPL W P-5'-P-CCNC: 14 U/L (ref 9–39)
BASOPHILS # BLD AUTO: 0.08 X10*3/UL (ref 0–0.1)
BASOPHILS NFR BLD AUTO: 0.9 %
BILIRUB SERPL-MCNC: 0.3 MG/DL (ref 0–1.2)
BUN SERPL-MCNC: 16 MG/DL (ref 6–23)
CALCIUM SERPL-MCNC: 9 MG/DL (ref 8.6–10.3)
CHLORIDE SERPL-SCNC: 109 MMOL/L (ref 98–107)
CHOLEST SERPL-MCNC: 140 MG/DL (ref 0–199)
CHOLESTEROL/HDL RATIO: 3.5
CO2 SERPL-SCNC: 22 MMOL/L (ref 21–32)
CREAT SERPL-MCNC: 1.12 MG/DL (ref 0.5–1.3)
EGFRCR SERPLBLD CKD-EPI 2021: 73 ML/MIN/1.73M*2
EOSINOPHIL # BLD AUTO: 0.14 X10*3/UL (ref 0–0.7)
EOSINOPHIL NFR BLD AUTO: 1.5 %
ERYTHROCYTE [DISTWIDTH] IN BLOOD BY AUTOMATED COUNT: 14.1 % (ref 11.5–14.5)
EST. AVERAGE GLUCOSE BLD GHB EST-MCNC: 111 MG/DL
GLUCOSE SERPL-MCNC: 75 MG/DL (ref 74–99)
HBA1C MFR BLD: 5.5 %
HCT VFR BLD AUTO: 46.4 % (ref 41–52)
HDLC SERPL-MCNC: 39.6 MG/DL
HGB BLD-MCNC: 15.1 G/DL (ref 13.5–17.5)
IMM GRANULOCYTES # BLD AUTO: 0.05 X10*3/UL (ref 0–0.7)
IMM GRANULOCYTES NFR BLD AUTO: 0.5 % (ref 0–0.9)
LDLC SERPL CALC-MCNC: 75 MG/DL
LYMPHOCYTES # BLD AUTO: 1.74 X10*3/UL (ref 1.2–4.8)
LYMPHOCYTES NFR BLD AUTO: 19.1 %
MCH RBC QN AUTO: 32.9 PG (ref 26–34)
MCHC RBC AUTO-ENTMCNC: 32.5 G/DL (ref 32–36)
MCV RBC AUTO: 101 FL (ref 80–100)
MONOCYTES # BLD AUTO: 0.68 X10*3/UL (ref 0.1–1)
MONOCYTES NFR BLD AUTO: 7.5 %
NEUTROPHILS # BLD AUTO: 6.43 X10*3/UL (ref 1.2–7.7)
NEUTROPHILS NFR BLD AUTO: 70.5 %
NON HDL CHOLESTEROL: 100 MG/DL (ref 0–149)
NRBC BLD-RTO: 0 /100 WBCS (ref 0–0)
PLATELET # BLD AUTO: 319 X10*3/UL (ref 150–450)
POTASSIUM SERPL-SCNC: 4.5 MMOL/L (ref 3.5–5.3)
PROT SERPL-MCNC: 6.1 G/DL (ref 6.4–8.2)
RBC # BLD AUTO: 4.59 X10*6/UL (ref 4.5–5.9)
SODIUM SERPL-SCNC: 138 MMOL/L (ref 136–145)
TRIGL SERPL-MCNC: 126 MG/DL (ref 0–149)
VIT B12 SERPL-MCNC: 1798 PG/ML (ref 211–911)
VLDL: 25 MG/DL (ref 0–40)
WBC # BLD AUTO: 9.1 X10*3/UL (ref 4.4–11.3)

## 2025-01-22 PROCEDURE — 71271 CT THORAX LUNG CANCER SCR C-: CPT | Performed by: RADIOLOGY

## 2025-01-22 PROCEDURE — 83036 HEMOGLOBIN GLYCOSYLATED A1C: CPT

## 2025-01-22 PROCEDURE — 71271 CT THORAX LUNG CANCER SCR C-: CPT

## 2025-01-28 ENCOUNTER — TELEPHONE (OUTPATIENT)
Dept: PRIMARY CARE | Facility: CLINIC | Age: 65
End: 2025-01-28
Payer: COMMERCIAL

## 2025-01-30 PROBLEM — I10 HYPERTENSION: Status: ACTIVE | Noted: 2025-01-30

## 2025-01-30 ASSESSMENT — ENCOUNTER SYMPTOMS
NAUSEA: 0
CHILLS: 0
VOMITING: 0
HEMATURIA: 0
DYSPNEA ON EXERTION: 0
FEVER: 0
ALTERED MENTAL STATUS: 0
SYNCOPE: 0
IRREGULAR HEARTBEAT: 0
SHORTNESS OF BREATH: 0
ORTHOPNEA: 0
WHEEZING: 0
HEMATOCHEZIA: 0
NEAR-SYNCOPE: 0
PALPITATIONS: 0
COUGH: 0

## 2025-01-30 NOTE — PATIENT INSTRUCTIONS
Recommend Mediterranean style of eating  Stop atorvastatin  Start rosuvastatin 40 mg daily  Check fasting lab work in 3 months  Follow-up with Dr. Syed in 6 months  If you have any questions or cardiac concerns, please call our office at [phone].

## 2025-01-30 NOTE — PROGRESS NOTES
"Chief Complaint/Reason for Visit:  Follow-up (6 mo f/u) 6 month cardiovascular follow up    History Of Present Illness:    Yadiel Basurto \"Al\" is a 64 y.o. male that presents to the office for 6 month follow up.    Taking medications as prescribed.     PMH is significant for HTN, COPD, CAD s/p PCI, PVD s/p PTA of left SFA in 2018 and dyslipidemia. Current smoker.    EKG personally reviewed today showed SR with PVCs with HR 73 bpm.  This will go to the cardiologist for final review.     Past Medical History:  He has a past medical history of Abnormal result of other cardiovascular function study (03/28/2018), Acute frontal sinusitis, unspecified (04/12/2021), Atherosclerosis of native arteries of extremities with intermittent claudication, unspecified extremity (CMS-HCC) (10/25/2019), Community acquired pneumonia of left lung (04/06/2023), Effusion, right knee (12/11/2020), Encounter for other preprocedural examination (09/26/2019), Hyperlipidemia, unspecified (12/03/2019), Old myocardial infarction, Other tear of medial meniscus, current injury, left knee, initial encounter (01/03/2020), Pain in right shoulder (07/15/2021), Personal history of other diseases of the circulatory system (12/03/2019), Personal history of other diseases of the respiratory system (04/12/2021), Personal history of other diseases of the respiratory system (11/07/2018), Personal history of other endocrine, nutritional and metabolic disease, Personal history of other specified conditions, Personal history of other specified conditions (04/18/2018), Personal history of other specified conditions (07/15/2021), Personal history of other specified conditions (11/07/2018), Segmental and somatic dysfunction of thoracic region (06/30/2020), Sprain of other specified parts of left knee, initial encounter (03/20/2020), and Strain of muscle and tendon of unspecified wall of thorax, initial encounter (06/30/2020).    Past Surgical History:  He has a " past surgical history that includes Hernia repair (03/20/2018); Other surgical history (12/03/2019); and Coronary angioplasty (04/17/2018).      Social History:  He reports that he has been smoking cigarettes. He has never used smokeless tobacco. He reports current alcohol use of about 14.0 standard drinks of alcohol per week. He reports that he does not use drugs.    Family History:  Family History   Problem Relation Name Age of Onset    Heart disease Mother      Heart attack Mother      Lung cancer Mother      Throat cancer Father          Allergies:  Lisinopril and Penicillins    Review of Systems   Constitutional: Negative for chills and fever.   Cardiovascular:  Negative for chest pain, claudication, dyspnea on exertion, irregular heartbeat, leg swelling, near-syncope, orthopnea, palpitations and syncope.   Respiratory:  Negative for cough, shortness of breath and wheezing.    Gastrointestinal:  Negative for hematochezia, melena, nausea and vomiting.   Genitourinary:  Negative for hematuria.   Psychiatric/Behavioral:  Negative for altered mental status.        Objective      Vitals reviewed.   Constitutional:       Appearance: Healthy appearance.   Pulmonary:      Effort: Pulmonary effort is normal.      Breath sounds: Normal breath sounds.   Cardiovascular:      PMI at left midclavicular line. Normal rate. Regular rhythm. S1 with normal intensity. S2 with normal intensity.       Murmurs: There is no murmur.   Edema:     Peripheral edema absent.   Abdominal:      General: Bowel sounds are normal.   Skin:     General: Skin is warm and dry.   Psychiatric:         Attention and Perception: Attention normal.         Mood and Affect: Mood normal.         Behavior: Behavior is cooperative.         Current Outpatient Medications   Medication Instructions    albuterol 2.5 mg /3 mL (0.083 %) nebulizer solution Every 4 hours PRN    albuterol 90 mcg/actuation inhaler 2 puffs, inhalation, Every 4 hours PRN, Proair inhaler     amLODIPine (NORVASC) 10 mg, oral, Daily    aspirin 81 mg EC tablet 1 tablet, Daily    atorvastatin (LIPITOR) 80 mg, oral, Nightly    budesonide-formoteroL (Symbicort) 160-4.5 mcg/actuation inhaler 2 puffs, inhalation, 2 times daily RT, Rinse mouth with water after use to reduce aftertaste and incidence of candidiasis. Do not swallow.    cholecalciferol (Vitamin D-3) 125 MCG (5000 UT) capsule 1 capsule, Daily    cilostazol (PLETAL) 100 mg, oral, 2 times daily    clopidogrel (PLAVIX) 75 mg, Daily    cyanocobalamin, vitamin B-12, 1,000 mcg tablet, sublingual 1 tablet, Daily    diclofenac sodium 2 g, Daily RT    fluticasone (Flonase) 50 mcg/actuation nasal spray 1 spray, Each Nostril, Daily, Shake gently. Before first use, prime pump. After use, clean tip and replace cap.    ipratropium-albuteroL (Duo-Neb) 0.5-2.5 mg/3 mL nebulizer solution 3 mL, 4 times daily RT    isosorbide mononitrate ER (Imdur) 30 mg 24 hr tablet TAKE ONE TABLET BY MOUTH EVERY MORNING    loratadine (CLARITIN) 10 mg, oral, Daily    losartan (COZAAR) 50 mg, oral, Every 12 hours    meloxicam (MOBIC) 15 mg, oral, Daily    metoprolol tartrate (LOPRESSOR) 50 mg, oral, Every 12 hours    tamsulosin (FLOMAX) 0.4 mg, oral, Daily    terbinafine (AntifungaL, terbinafine,) 1 % cream Topical, 2 times daily    tiZANidine (ZANAFLEX) 2 mg, oral, Every 6 hours PRN    traZODone (DESYREL) 100 mg, oral, Nightly        Reviewed the following Labs:  CBC -  Lab Results   Component Value Date    WBC 9.1 01/22/2025    HGB 15.1 01/22/2025    HCT 46.4 01/22/2025     (H) 01/22/2025     01/22/2025       RENAL FUNCTION PANEL -   Lab Results   Component Value Date    GLUCOSE 75 01/22/2025     01/22/2025    K 4.5 01/22/2025     (H) 01/22/2025    CO2 22 01/22/2025    ANIONGAP 12 01/22/2025    BUN 16 01/22/2025    CREATININE 1.12 01/22/2025    GFRMALE 86 12/30/2022    CALCIUM 9.0 01/22/2025    PHOS 2.7 04/06/2018    ALBUMIN 3.9 01/22/2025        CMP  -  Lab Results   Component Value Date    CALCIUM 9.0 01/22/2025    PHOS 2.7 04/06/2018    PROT 6.1 (L) 01/22/2025    ALBUMIN 3.9 01/22/2025    AST 14 01/22/2025    ALT 15 01/22/2025    ALKPHOS 137 (H) 01/22/2025    BILITOT 0.3 01/22/2025       LIPID PANEL -   Lab Results   Component Value Date    CHOL 140 01/22/2025    TRIG 126 01/22/2025    HDL 39.6 01/22/2025    CHHDL 3.5 01/22/2025    LDLF 46 12/30/2022    VLDL 25 01/22/2025    NHDL 100 01/22/2025     Lab Results   Component Value Date    LDLCALC 75 01/22/2025       Lab Results   Component Value Date    HGBA1C 5.5 01/22/2025       Lab Results   Component Value Date    TSH 1.33 07/15/2021       No results found for this or any previous visit.     Reviewed the following Cardiology Tests:    MARCO ANTONIO 2/6/25  Right Lower PVR: Evidence of moderate arterial occlusive disease in the right lower extremity at rest. Decreased digital perfusion noted. Monophasic flow is noted in the right posterior tibial artery. Multiphasic flow is noted in the right common femoral artery and right dorsalis pedis artery.  Left Lower PVR: Evidence of moderate arterial occlusive disease in the left lower extremity at rest. Decreased digital perfusion noted. Monophasic flow is noted in the left posterior tibial artery and left dorsalis pedis artery. Multiphasic flow is noted in the left common femoral artery.  Comparison:  Compared with study from 4/8/2024, Rt MARCO ANTONIO was decreased from mild to moderate.    MARCO ANTONIO 4/8/24  Right Lower PVR: Evidence of mild arterial occlusive disease in the right lower extremity at rest. Decreased digital perfusion noted. Biphasic flow is noted in the right posterior tibial artery and right dorsalis pedis artery. Triphasic flow is noted in the right common femoral artery.  Left Lower PVR: Evidence of moderate arterial occlusive disease in the left lower extremity at rest. Decreased digital perfusion noted. Monophasic flow is noted in the left dorsalis pedis artery.  "Biphasic flow is noted in the left common femoral artery and left posterior tibial artery.    Echo 3/8/22:   1. The left ventricular systolic function is normal with a 60% estimated ejection fraction.     LHC 10/14/19:   1. Mild non-obstructive coronary artery disease.   2. Elevated LV filling pressures.   3. Left Ventricular end-diastolic pressure = 23.  Patent stent to the RCA.     Visit Vitals  /70 (BP Location: Left arm)   Pulse 73   Ht 1.702 m (5' 7\")   Wt 76.7 kg (169 lb)   BMI 26.47 kg/m²   Smoking Status Every Day   BSA 1.9 m²       Assessment/Plan   The primary encounter diagnosis was Arteriosclerosis of coronary artery. Diagnoses of PAD (peripheral artery disease) (CMS-[facility]), Dyslipidemia, goal LDL below 70, Hypertension, unspecified type, Coronary artery disease of native artery of native heart with stable angina pectoris, and Claudication, intermittent (CMS-HCC) were also pertinent to this visit.    1. CAD s/p PCI RCA (complex intervention with rotational atherectomy) in 2018   Continue DAPT - aspirin 81 mg daily and clopidogrel 75 mg daily  Continue atorvastatin 80 mg daily and Metoprolol tartrate 50 mg BID  Continue isosorbide mononitrate 30 mg daily  LHC in 2019 with patent stent RCA  TTE March 2022 with LVEF 60%     2. PVD S/P PTA left SFA in 2018  Stable  MARCO ANTONIO March 2022 with no evidence of arterial occlusive disease  MARCO ANTONIO April 2024 with mild arterial occlusive disease RLE; moderate arterial occlusive disease LLE at rest  MARCO ANTONIO Feb 2025 - evidence of moderate arterial occlusive disease bilateral lower extremities at rest.  He denies any intermittent claudication or wounds to legs or feet  If he has worsening intermittent claudication, recommend lower extremity angiogram    3. Dyslipidemia  Recommend Mediterranean style of eating  Goal LDL <70.  Typically LDL is <70, but lab work Jan 2025 with LDL 75  Stop atorvastatin 80 mg daily.   Start rosuvastatin 40 mg daily.  Check fasting lab work in 3 " months     4. HTN   Stable  Continue current antihypertensives: isosorbide mononitrate 30 mg daily, losartan 50 mg daily and metoprolol tartrate 50 mg BID      Milly Cardoza, APRN-CNP

## 2025-02-06 ENCOUNTER — HOSPITAL ENCOUNTER (OUTPATIENT)
Dept: VASCULAR MEDICINE | Facility: HOSPITAL | Age: 65
Discharge: HOME | End: 2025-02-06
Payer: COMMERCIAL

## 2025-02-06 DIAGNOSIS — I25.118 CORONARY ARTERY DISEASE OF NATIVE ARTERY OF NATIVE HEART WITH STABLE ANGINA PECTORIS: ICD-10-CM

## 2025-02-06 DIAGNOSIS — I73.9 PAD (PERIPHERAL ARTERY DISEASE) (CMS-HCC): ICD-10-CM

## 2025-02-06 DIAGNOSIS — I73.9 CLAUDICATION, INTERMITTENT (CMS-HCC): ICD-10-CM

## 2025-02-06 PROCEDURE — 93922 UPR/L XTREMITY ART 2 LEVELS: CPT

## 2025-02-06 PROCEDURE — 93922 UPR/L XTREMITY ART 2 LEVELS: CPT | Performed by: SURGERY

## 2025-02-08 LAB
ALBUMIN/CREAT UR: 2 MG/G CREAT
CREAT UR-MCNC: 97 MG/DL (ref 20–320)
MICROALBUMIN UR-MCNC: 0.2 MG/DL

## 2025-02-12 ENCOUNTER — OFFICE VISIT (OUTPATIENT)
Dept: CARDIOLOGY | Facility: HOSPITAL | Age: 65
End: 2025-02-12
Payer: COMMERCIAL

## 2025-02-12 VITALS
HEART RATE: 73 BPM | HEIGHT: 67 IN | BODY MASS INDEX: 26.53 KG/M2 | DIASTOLIC BLOOD PRESSURE: 70 MMHG | WEIGHT: 169 LBS | SYSTOLIC BLOOD PRESSURE: 126 MMHG

## 2025-02-12 DIAGNOSIS — I73.9 PAD (PERIPHERAL ARTERY DISEASE) (CMS-HCC): ICD-10-CM

## 2025-02-12 DIAGNOSIS — I10 HYPERTENSION, UNSPECIFIED TYPE: ICD-10-CM

## 2025-02-12 DIAGNOSIS — E78.5 DYSLIPIDEMIA, GOAL LDL BELOW 70: ICD-10-CM

## 2025-02-12 DIAGNOSIS — I25.10 ARTERIOSCLEROSIS OF CORONARY ARTERY: Primary | ICD-10-CM

## 2025-02-12 DIAGNOSIS — I25.118 CORONARY ARTERY DISEASE OF NATIVE ARTERY OF NATIVE HEART WITH STABLE ANGINA PECTORIS: ICD-10-CM

## 2025-02-12 DIAGNOSIS — I73.9 CLAUDICATION, INTERMITTENT (CMS-HCC): ICD-10-CM

## 2025-02-12 DIAGNOSIS — E78.5 DYSLIPIDEMIA: ICD-10-CM

## 2025-02-12 LAB
ATRIAL RATE: 73 BPM
P AXIS: 61 DEGREES
P OFFSET: 192 MS
P ONSET: 145 MS
PR INTERVAL: 158 MS
Q ONSET: 224 MS
QRS COUNT: 12 BEATS
QRS DURATION: 86 MS
QT INTERVAL: 402 MS
QTC CALCULATION(BAZETT): 442 MS
QTC FREDERICIA: 429 MS
R AXIS: 92 DEGREES
T AXIS: 9 DEGREES
T OFFSET: 425 MS
VENTRICULAR RATE: 73 BPM

## 2025-02-12 PROCEDURE — 93005 ELECTROCARDIOGRAM TRACING: CPT | Performed by: NURSE PRACTITIONER

## 2025-02-12 PROCEDURE — 3008F BODY MASS INDEX DOCD: CPT | Performed by: NURSE PRACTITIONER

## 2025-02-12 PROCEDURE — 99214 OFFICE O/P EST MOD 30 MIN: CPT | Performed by: NURSE PRACTITIONER

## 2025-02-12 PROCEDURE — 3078F DIAST BP <80 MM HG: CPT | Performed by: NURSE PRACTITIONER

## 2025-02-12 PROCEDURE — 3074F SYST BP LT 130 MM HG: CPT | Performed by: NURSE PRACTITIONER

## 2025-02-12 PROCEDURE — 99214 OFFICE O/P EST MOD 30 MIN: CPT | Mod: 25 | Performed by: NURSE PRACTITIONER

## 2025-02-12 RX ORDER — CLOPIDOGREL BISULFATE 75 MG/1
75 TABLET ORAL DAILY
Qty: 90 TABLET | Refills: 3 | Status: SHIPPED | OUTPATIENT
Start: 2025-02-12 | End: 2026-02-12

## 2025-02-12 RX ORDER — ROSUVASTATIN CALCIUM 40 MG/1
40 TABLET, COATED ORAL DAILY
Qty: 90 TABLET | Refills: 3 | Status: SHIPPED | OUTPATIENT
Start: 2025-02-12 | End: 2026-02-12

## 2025-02-12 ASSESSMENT — ENCOUNTER SYMPTOMS
CLAUDICATION: 0
LOSS OF SENSATION IN FEET: 1
OCCASIONAL FEELINGS OF UNSTEADINESS: 1
DEPRESSION: 0

## 2025-04-01 DIAGNOSIS — I73.9 PAD (PERIPHERAL ARTERY DISEASE) (CMS-HCC): ICD-10-CM

## 2025-04-01 DIAGNOSIS — E78.5 DYSLIPIDEMIA, GOAL LDL BELOW 70: ICD-10-CM

## 2025-04-01 DIAGNOSIS — R73.02 IGT (IMPAIRED GLUCOSE TOLERANCE): ICD-10-CM

## 2025-04-01 RX ORDER — BUDESONIDE AND FORMOTEROL FUMARATE DIHYDRATE 160; 4.5 UG/1; UG/1
AEROSOL RESPIRATORY (INHALATION)
Qty: 10.2 G | Refills: 5 | Status: SHIPPED | OUTPATIENT
Start: 2025-04-01

## 2025-04-23 ENCOUNTER — TELEPHONE (OUTPATIENT)
Dept: PRIMARY CARE | Facility: CLINIC | Age: 65
End: 2025-04-23

## 2025-04-23 ENCOUNTER — APPOINTMENT (OUTPATIENT)
Dept: PRIMARY CARE | Facility: CLINIC | Age: 65
End: 2025-04-23
Payer: COMMERCIAL

## 2025-04-23 VITALS
HEIGHT: 67 IN | DIASTOLIC BLOOD PRESSURE: 64 MMHG | HEART RATE: 50 BPM | WEIGHT: 167 LBS | SYSTOLIC BLOOD PRESSURE: 100 MMHG | BODY MASS INDEX: 26.21 KG/M2

## 2025-04-23 DIAGNOSIS — I73.9 PAD (PERIPHERAL ARTERY DISEASE) (CMS-HCC): ICD-10-CM

## 2025-04-23 DIAGNOSIS — E78.5 DYSLIPIDEMIA, GOAL LDL BELOW 70: ICD-10-CM

## 2025-04-23 DIAGNOSIS — M12.812 LEFT ROTATOR CUFF TEAR ARTHROPATHY: ICD-10-CM

## 2025-04-23 DIAGNOSIS — F10.20 UNCOMPLICATED ALCOHOL DEPENDENCE (MULTI): ICD-10-CM

## 2025-04-23 DIAGNOSIS — J44.89 COPD (CHRONIC OBSTRUCTIVE PULMONARY DISEASE) WITH CHRONIC BRONCHITIS (MULTI): ICD-10-CM

## 2025-04-23 DIAGNOSIS — R73.02 IGT (IMPAIRED GLUCOSE TOLERANCE): Primary | ICD-10-CM

## 2025-04-23 DIAGNOSIS — M75.102 LEFT ROTATOR CUFF TEAR ARTHROPATHY: ICD-10-CM

## 2025-04-23 PROBLEM — R73.9 HYPERGLYCEMIA: Status: RESOLVED | Noted: 2024-03-25 | Resolved: 2025-04-23

## 2025-04-23 PROBLEM — I10 HYPERTENSION: Status: RESOLVED | Noted: 2025-01-30 | Resolved: 2025-04-23

## 2025-04-23 PROCEDURE — 99213 OFFICE O/P EST LOW 20 MIN: CPT | Performed by: INTERNAL MEDICINE

## 2025-04-23 PROCEDURE — G2211 COMPLEX E/M VISIT ADD ON: HCPCS | Performed by: INTERNAL MEDICINE

## 2025-04-23 PROCEDURE — 3008F BODY MASS INDEX DOCD: CPT | Performed by: INTERNAL MEDICINE

## 2025-04-23 ASSESSMENT — ANXIETY QUESTIONNAIRES
2. NOT BEING ABLE TO STOP OR CONTROL WORRYING: NOT AT ALL
7. FEELING AFRAID AS IF SOMETHING AWFUL MIGHT HAPPEN: NOT AT ALL
5. BEING SO RESTLESS THAT IT IS HARD TO SIT STILL: NOT AT ALL
6. BECOMING EASILY ANNOYED OR IRRITABLE: NOT AT ALL
IF YOU CHECKED OFF ANY PROBLEMS ON THIS QUESTIONNAIRE, HOW DIFFICULT HAVE THESE PROBLEMS MADE IT FOR YOU TO DO YOUR WORK, TAKE CARE OF THINGS AT HOME, OR GET ALONG WITH OTHER PEOPLE: NOT DIFFICULT AT ALL
4. TROUBLE RELAXING: NOT AT ALL
GAD7 TOTAL SCORE: 0
1. FEELING NERVOUS, ANXIOUS, OR ON EDGE: NOT AT ALL
3. WORRYING TOO MUCH ABOUT DIFFERENT THINGS: NOT AT ALL

## 2025-04-23 ASSESSMENT — ENCOUNTER SYMPTOMS
DEPRESSION: 0
LOSS OF SENSATION IN FEET: 0
OCCASIONAL FEELINGS OF UNSTEADINESS: 0

## 2025-04-23 ASSESSMENT — COLUMBIA-SUICIDE SEVERITY RATING SCALE - C-SSRS
2. HAVE YOU ACTUALLY HAD ANY THOUGHTS OF KILLING YOURSELF?: NO
6. HAVE YOU EVER DONE ANYTHING, STARTED TO DO ANYTHING, OR PREPARED TO DO ANYTHING TO END YOUR LIFE?: NO
1. IN THE PAST MONTH, HAVE YOU WISHED YOU WERE DEAD OR WISHED YOU COULD GO TO SLEEP AND NOT WAKE UP?: NO

## 2025-04-23 ASSESSMENT — PATIENT HEALTH QUESTIONNAIRE - PHQ9
2. FEELING DOWN, DEPRESSED OR HOPELESS: NOT AT ALL
1. LITTLE INTEREST OR PLEASURE IN DOING THINGS: NOT AT ALL
SUM OF ALL RESPONSES TO PHQ9 QUESTIONS 1 AND 2: 0

## 2025-04-23 NOTE — ASSESSMENT & PLAN NOTE
Orders:    MR arthrogram shoulder left; Future    Follow Up In [facility] - PCP - Established; Future

## 2025-04-23 NOTE — ASSESSMENT & PLAN NOTE
Orders:    Follow Up In [facility] - PCP - Established    Comprehensive Metabolic Panel; Future    Hemoglobin A1C; Future    Lipid Panel; Future    Albumin-Creatinine Ratio, Urine Random; Future

## 2025-04-23 NOTE — ASSESSMENT & PLAN NOTE
Orders:    Follow Up In [facility] - PCP - Established    Follow Up In [facility] - PCP - Established; Future    Comprehensive Metabolic Panel; Future    Hemoglobin A1C; Future    Lipid Panel; Future    Albumin-Creatinine Ratio, Urine Random; Future

## 2025-04-23 NOTE — PROGRESS NOTES
"Subjective   Patient ID: Raoul Basurto is a 64 y.o. male who presents for Follow-up.    HPI     Review of Systems    Objective   /64   Pulse 50   Ht 1.702 m (5' 7\")   Wt 75.8 kg (167 lb)   BMI 26.16 kg/m²     Physical Exam    Assessment/Plan          "

## 2025-04-23 NOTE — TELEPHONE ENCOUNTER
You have the order for MR arthrogram left shoulder ---also need additional order for x-ray arthrogram left shoulder       (Only done at [facility])

## 2025-04-23 NOTE — PROGRESS NOTES
"Subjective   Reason for Visit: Yadiel Basurto is an 64 y.o. male here for a Medicare Wellness visit.          Reviewed all medications by prescribing practitioner or clinical pharmacist (such as prescriptions, OTCs, herbal therapies and supplements) and documented in the medical record.    HPI    Patient Care Team:  Link Fajardo DO as PCP - General     Review of Systems   All other systems reviewed and are negative.      Objective   Vitals:  /64   Pulse 50   Ht 1.702 m (5' 7\")   Wt 75.8 kg (167 lb)   BMI 26.16 kg/m²       Physical Exam  Vitals and nursing note reviewed.   Constitutional:       General: He is not in acute distress.     Appearance: Normal appearance. He is well-developed. He is not toxic-appearing.   HENT:      Head: Normocephalic and atraumatic.      Right Ear: Tympanic membrane and external ear normal.      Left Ear: Tympanic membrane and external ear normal.      Nose: Nose normal.      Mouth/Throat:      Mouth: Mucous membranes are moist.      Pharynx: Oropharynx is clear. No oropharyngeal exudate or posterior oropharyngeal erythema.      Tonsils: No tonsillar exudate. 2+ on the right. 2+ on the left.   Eyes:      Extraocular Movements: Extraocular movements intact.      Conjunctiva/sclera: Conjunctivae normal.   Cardiovascular:      Rate and Rhythm: Normal rate and regular rhythm.      Pulses: Normal pulses.      Heart sounds: Normal heart sounds. No murmur heard.  Pulmonary:      Effort: Pulmonary effort is normal.      Breath sounds: Normal breath sounds.   Abdominal:      General: Abdomen is flat. Bowel sounds are normal.      Palpations: Abdomen is soft.   Musculoskeletal:      Cervical back: Neck supple.   Feet:      Right foot:      Skin integrity: Skin integrity normal. No ulcer, blister, skin breakdown, erythema, warmth or callus.      Toenail Condition: Right toenails are normal.      Left foot:      Skin integrity: Skin integrity normal. No ulcer, blister, skin " breakdown, erythema, warmth or callus.      Toenail Condition: Left toenails are normal.   Lymphadenopathy:      Cervical: No cervical adenopathy.   Skin:     General: Skin is warm and dry.      Capillary Refill: Capillary refill takes more than 3 seconds.      Findings: No rash.   Neurological:      Mental Status: He is alert. Mental status is at baseline.      Sensory: Sensation is intact.   Psychiatric:         Mood and Affect: Mood normal.         Behavior: Behavior normal.         Thought Content: Thought content normal.         Judgment: Judgment normal.         Assessment & Plan  PAD (peripheral artery disease) ([facility])    Orders:    Follow Up [facility] - PCP - Established    Follow Up [facility] - PCP - Established; Future    Comprehensive Metabolic Panel; Future    Hemoglobin A1C; Future    Lipid Panel; Future    Albumin-Creatinine Ratio, Urine Random; Future    Dyslipidemia, goal LDL below 70    Orders:    Follow Up [facility] - PCP - Established    Lipid Panel; Future    IGT (impaired glucose tolerance)    Orders:    Follow Up [facility] - PCP - Established    Comprehensive Metabolic Panel; Future    Hemoglobin A1C; Future    Lipid Panel; Future    Albumin-Creatinine Ratio, Urine Random; Future    Uncomplicated alcohol dependence (Multi)         COPD (chronic obstructive pulmonary disease) with chronic bronchitis (Multi)         Left rotator cuff tear arthropathy    Orders:    MR arthrogram shoulder left; Future    Follow Up In [facility] - PCP - Established; Future

## 2025-05-06 DIAGNOSIS — Z12.11 COLON CANCER SCREENING: ICD-10-CM

## 2025-05-10 LAB
CHOLEST SERPL-MCNC: 87 MG/DL
CHOLEST/HDLC SERPL: 2.4 (CALC)
HDLC SERPL-MCNC: 36 MG/DL
LDLC SERPL CALC-MCNC: 37 MG/DL (CALC)
NONHDLC SERPL-MCNC: 51 MG/DL (CALC)
NONINV COLON CA DNA+OCC BLD SCRN STL QL: NEGATIVE
TRIGL SERPL-MCNC: 68 MG/DL

## 2025-05-12 DIAGNOSIS — E78.5 DYSLIPIDEMIA: ICD-10-CM

## 2025-05-17 ENCOUNTER — APPOINTMENT (OUTPATIENT)
Dept: RADIOLOGY | Facility: HOSPITAL | Age: 65
End: 2025-05-17
Payer: COMMERCIAL

## 2025-05-17 DIAGNOSIS — M12.812 LEFT ROTATOR CUFF TEAR ARTHROPATHY: ICD-10-CM

## 2025-05-17 DIAGNOSIS — M75.102 LEFT ROTATOR CUFF TEAR ARTHROPATHY: ICD-10-CM

## 2025-05-17 PROCEDURE — 73221 MRI JOINT UPR EXTREM W/O DYE: CPT | Mod: LEFT SIDE | Performed by: RADIOLOGY

## 2025-05-17 PROCEDURE — 73221 MRI JOINT UPR EXTREM W/O DYE: CPT | Mod: LT

## 2025-06-30 DIAGNOSIS — I11.9 HYPERTENSIVE HEART DISEASE WITHOUT HEART FAILURE: ICD-10-CM

## 2025-06-30 DIAGNOSIS — I25.118 CORONARY ARTERY DISEASE OF NATIVE ARTERY OF NATIVE HEART WITH STABLE ANGINA PECTORIS: ICD-10-CM

## 2025-06-30 RX ORDER — ISOSORBIDE MONONITRATE 30 MG/1
30 TABLET, EXTENDED RELEASE ORAL
Qty: 90 TABLET | Refills: 3 | Status: SHIPPED | OUTPATIENT
Start: 2025-06-30

## 2025-06-30 RX ORDER — LOSARTAN POTASSIUM 50 MG/1
50 TABLET ORAL EVERY 12 HOURS
Qty: 180 TABLET | Refills: 3 | Status: SHIPPED | OUTPATIENT
Start: 2025-06-30

## 2025-07-14 DIAGNOSIS — F51.04 CHRONIC INSOMNIA: ICD-10-CM

## 2025-07-14 DIAGNOSIS — I10 BENIGN ESSENTIAL HYPERTENSION: ICD-10-CM

## 2025-07-14 DIAGNOSIS — I25.118 CORONARY ARTERY DISEASE OF NATIVE ARTERY OF NATIVE HEART WITH STABLE ANGINA PECTORIS: ICD-10-CM

## 2025-07-14 DIAGNOSIS — M54.50 CHRONIC BILATERAL LOW BACK PAIN WITHOUT SCIATICA: ICD-10-CM

## 2025-07-14 DIAGNOSIS — G89.29 CHRONIC BILATERAL LOW BACK PAIN WITHOUT SCIATICA: ICD-10-CM

## 2025-07-15 DIAGNOSIS — B35.3 TINEA PEDIS OF BOTH FEET: ICD-10-CM

## 2025-07-15 RX ORDER — METOPROLOL TARTRATE 50 MG/1
50 TABLET ORAL EVERY 12 HOURS
Qty: 180 TABLET | Refills: 3 | Status: SHIPPED | OUTPATIENT
Start: 2025-07-15

## 2025-07-15 RX ORDER — CYCLOBENZAPRINE HCL 10 MG
10 TABLET ORAL NIGHTLY PRN
Qty: 90 TABLET | Refills: 3 | Status: SHIPPED | OUTPATIENT
Start: 2025-07-15

## 2025-07-15 RX ORDER — TRAZODONE HYDROCHLORIDE 100 MG/1
100 TABLET ORAL NIGHTLY
Qty: 90 TABLET | Refills: 3 | Status: SHIPPED | OUTPATIENT
Start: 2025-07-15

## 2025-07-15 RX ORDER — AMLODIPINE BESYLATE 10 MG/1
10 TABLET ORAL DAILY
Qty: 90 TABLET | Refills: 3 | Status: SHIPPED | OUTPATIENT
Start: 2025-07-15

## 2025-07-16 RX ORDER — CLOTRIMAZOLE 1 %
CREAM (GRAM) TOPICAL
Qty: 45 G | Refills: 0 | Status: SHIPPED | OUTPATIENT
Start: 2025-07-16

## 2025-07-17 LAB
ALBUMIN SERPL-MCNC: 4.3 G/DL (ref 3.6–5.1)
ALP SERPL-CCNC: 103 U/L (ref 35–144)
ALT SERPL-CCNC: 19 U/L (ref 9–46)
ANION GAP SERPL CALCULATED.4IONS-SCNC: 9 MMOL/L (CALC) (ref 7–17)
AST SERPL-CCNC: 17 U/L (ref 10–35)
BILIRUB SERPL-MCNC: 0.3 MG/DL (ref 0.2–1.2)
BUN SERPL-MCNC: 14 MG/DL (ref 7–25)
CALCIUM SERPL-MCNC: 9.3 MG/DL (ref 8.6–10.3)
CHLORIDE SERPL-SCNC: 110 MMOL/L (ref 98–110)
CHOLEST SERPL-MCNC: 123 MG/DL
CHOLEST/HDLC SERPL: 2.6 (CALC)
CO2 SERPL-SCNC: 20 MMOL/L (ref 20–32)
CREAT SERPL-MCNC: 1.12 MG/DL (ref 0.7–1.35)
EGFRCR SERPLBLD CKD-EPI 2021: 73 ML/MIN/1.73M2
EST. AVERAGE GLUCOSE BLD GHB EST-MCNC: 103 MG/DL
EST. AVERAGE GLUCOSE BLD GHB EST-SCNC: 5.7 MMOL/L
GLUCOSE SERPL-MCNC: 79 MG/DL (ref 65–99)
HBA1C MFR BLD: 5.2 %
HDLC SERPL-MCNC: 47 MG/DL
LDLC SERPL CALC-MCNC: 55 MG/DL (CALC)
NONHDLC SERPL-MCNC: 76 MG/DL (CALC)
POTASSIUM SERPL-SCNC: 4.4 MMOL/L (ref 3.5–5.3)
PROT SERPL-MCNC: 6.9 G/DL (ref 6.1–8.1)
SODIUM SERPL-SCNC: 139 MMOL/L (ref 135–146)
TRIGL SERPL-MCNC: 128 MG/DL

## 2025-07-18 LAB
ALBUMIN/CREAT UR: 8 MG/G CREAT
CREAT UR-MCNC: 159 MG/DL (ref 20–320)
MICROALBUMIN UR-MCNC: 1.2 MG/DL

## 2025-07-25 ENCOUNTER — APPOINTMENT (OUTPATIENT)
Dept: PRIMARY CARE | Facility: CLINIC | Age: 65
End: 2025-07-25
Payer: COMMERCIAL

## 2025-07-25 VITALS
BODY MASS INDEX: 26.21 KG/M2 | DIASTOLIC BLOOD PRESSURE: 70 MMHG | WEIGHT: 167 LBS | SYSTOLIC BLOOD PRESSURE: 100 MMHG | HEART RATE: 60 BPM | HEIGHT: 67 IN

## 2025-07-25 DIAGNOSIS — M12.812 LEFT ROTATOR CUFF TEAR ARTHROPATHY: ICD-10-CM

## 2025-07-25 DIAGNOSIS — I11.9 HYPERTENSIVE HEART DISEASE WITHOUT HEART FAILURE: Primary | ICD-10-CM

## 2025-07-25 DIAGNOSIS — F17.210 SMOKING GREATER THAN 40 PACK YEARS: ICD-10-CM

## 2025-07-25 DIAGNOSIS — M75.102 LEFT ROTATOR CUFF TEAR ARTHROPATHY: ICD-10-CM

## 2025-07-25 DIAGNOSIS — I73.9 PAD (PERIPHERAL ARTERY DISEASE): ICD-10-CM

## 2025-07-25 DIAGNOSIS — E78.5 DYSLIPIDEMIA, GOAL LDL BELOW 70: ICD-10-CM

## 2025-07-25 DIAGNOSIS — N40.0 BENIGN PROSTATIC HYPERPLASIA WITHOUT LOWER URINARY TRACT SYMPTOMS: ICD-10-CM

## 2025-07-25 PROCEDURE — 3008F BODY MASS INDEX DOCD: CPT | Performed by: INTERNAL MEDICINE

## 2025-07-25 PROCEDURE — 99213 OFFICE O/P EST LOW 20 MIN: CPT | Performed by: INTERNAL MEDICINE

## 2025-07-25 RX ORDER — CLOTRIMAZOLE AND BETAMETHASONE DIPROPIONATE 10; .64 MG/G; MG/G
1 CREAM TOPICAL 2 TIMES DAILY
COMMUNITY
Start: 2025-07-15

## 2025-07-25 RX ORDER — GENTAMICIN SULFATE 1 MG/G
OINTMENT TOPICAL
COMMUNITY
Start: 2025-07-15

## 2025-07-25 NOTE — ASSESSMENT & PLAN NOTE
Reevaluate PSA with next visit symptoms controlled on tamsulosin 0.4 mg daily  Orders:    PSA; Future

## 2025-07-25 NOTE — ASSESSMENT & PLAN NOTE
Optimal LDL cholesterol noted with blood work on rosuvastatin maximal dose 40 mg daily with LDL goal 55 triglycerides 128 HDL 47 work on smoking cessation  Orders:    CT lung screening low dose; Future    Follow Up In [facility] - PCP - Established; Future    Comprehensive Metabolic Panel; Future    Lipid Panel; Future    Albumin-Creatinine Ratio, Urine Random; Future

## 2025-07-25 NOTE — ASSESSMENT & PLAN NOTE
Smoking half pack to a pack of cigarettes a day continue to aggressively advocate for smoking cessation as a pertains to his lung nodules emphysema cardiac disease and severe peripheral vascular disease continue with lung cancer screening ordered today for continued active surveillance  Orders:    CT lung screening low dose; Future

## 2025-07-25 NOTE — ASSESSMENT & PLAN NOTE
Patient evaluation with interventional vascular surgery for possible stent of left lower extremity claudication with PAD continue medical therapy with cilostazol 100 mg twice a day aspirin 81 mg daily and clopidogrel 75 mg daily  Orders:    Follow Up In [facility] - PCP - Established    CT lung screening low dose; Future    Follow [facility] - PCP - Established; Future    Comprehensive Metabolic Panel; Future    Lipid Panel; Future    Albumin-Creatinine Ratio, Urine Random; Future

## 2025-07-25 NOTE — ASSESSMENT & PLAN NOTE
Blood pressure controlled on metoprolol tartrate 50 mg twice a day and losartan 50 mg twice a day isosorbide 30 mg daily and amlodipine 10 mg daily  Orders:    CT lung screening low dose; Future    Follow Up In [facility] - PCP - Established; Future    Comprehensive Metabolic Panel; Future    Lipid Panel; Future    Albumin-Creatinine Ratio, Urine Random; Future

## 2025-07-25 NOTE — ASSESSMENT & PLAN NOTE
Stable at this time continue to monitor after injection  Orders:    Follow Up In [facility] - PCP - Established

## 2025-07-25 NOTE — PROGRESS NOTES
"Subjective   Reason for Visit: Yadiel Basurto is an 64 y.o. male here for a Medicare Wellness visit.     Past Medical, Surgical, and Family History reviewed and updated in chart.    Reviewed all medications by prescribing practitioner or clinical pharmacist (such as prescriptions, OTCs, herbal therapies and supplements) and documented in the medical record.    HPI    Patient Care Team:  Link Fajardo DO as PCP - General  Link Fajardo DO as PCP - [employer] PCP     Review of Systems   All other systems reviewed and are negative.      Objective   Vitals:  /70   Pulse 60   Ht 1.702 m (5' 7\")   Wt 75.8 kg (167 lb)   BMI 26.16 kg/m²       Physical Exam  Vitals and nursing note reviewed.   Constitutional:       General: He is not in acute distress.     Appearance: Normal appearance. He is well-developed. He is not toxic-appearing.   HENT:      Head: Normocephalic and atraumatic.      Right Ear: Tympanic membrane and external ear normal.      Left Ear: Tympanic membrane and external ear normal.      Nose: Nose normal.      Mouth/Throat:      Mouth: Mucous membranes are moist.      Pharynx: Oropharynx is clear. No oropharyngeal exudate or posterior oropharyngeal erythema.      Tonsils: No tonsillar exudate. 2+ on the right. 2+ on the left.     Eyes:      Extraocular Movements: Extraocular movements intact.      Conjunctiva/sclera: Conjunctivae normal.       Cardiovascular:      Rate and Rhythm: Normal rate and regular rhythm.      Pulses: Normal pulses.      Heart sounds: Normal heart sounds. No murmur heard.  Pulmonary:      Effort: Pulmonary effort is normal.      Breath sounds: Normal breath sounds.   Abdominal:      General: Abdomen is flat. Bowel sounds are normal.      Palpations: Abdomen is soft.     Musculoskeletal:      Cervical back: Neck supple.   Feet:      Right foot:      Skin integrity: Skin integrity normal. No ulcer, blister, skin breakdown, erythema, warmth or callus. "      Toenail Condition: Right toenails are normal.      Left foot:      Skin integrity: Skin integrity normal. No ulcer, blister, skin breakdown, erythema, warmth or callus.      Toenail Condition: Left toenails are normal.   Lymphadenopathy:      Cervical: No cervical adenopathy.     Skin:     General: Skin is warm and dry.      Capillary Refill: Capillary refill takes more than 3 seconds.      Findings: No rash.     Neurological:      Mental Status: He is alert. Mental status is at baseline.      Sensory: Sensation is intact.     Psychiatric:         Mood and Affect: Mood normal.         Behavior: Behavior normal.         Thought Content: Thought content normal.         Judgment: Judgment normal.         Assessment & Plan  PAD (peripheral artery disease)  Patient evaluation with interventional vascular surgery for possible stent of left lower extremity claudication with PAD continue medical therapy with cilostazol 100 mg twice a day aspirin 81 mg daily and clopidogrel 75 mg daily  Orders:    Follow Up In [facility] - PCP - Established    CT lung screening low dose; Future    Follow Up [facility] - PCP - Established; Future    Comprehensive Metabolic Panel; Future    Lipid Panel; Future    Albumin-Creatinine Ratio, Urine Random; Future    Left rotator cuff tear arthropathy  Stable at this time continue to monitor after injection  Orders:    Follow Up In [facility] - PCP - Established    Dyslipidemia, goal LDL below 70  Optimal LDL cholesterol noted with blood work on rosuvastatin maximal dose 40 mg daily with LDL goal 55 triglycerides 128 HDL 47 work on smoking cessation  Orders:    CT lung screening low dose; Future    Follow Up In [facility] - PCP - Established; Future    Comprehensive Metabolic Panel; Future    Lipid Panel; Future    Albumin-Creatinine Ratio, Urine Random; Future    Hypertensive heart disease without heart failure  Blood pressure controlled on  metoprolol tartrate 50 mg twice a day and losartan 50 mg twice a day isosorbide 30 mg daily and amlodipine 10 mg daily  Orders:    CT lung screening low dose; Future    Follow Up In [facility] - PCP - Established; Future    Comprehensive Metabolic Panel; Future    Lipid Panel; Future    Albumin-Creatinine Ratio, Urine Random; Future    Benign prostatic hyperplasia without lower urinary tract symptoms  Reevaluate PSA with next visit symptoms controlled on tamsulosin 0.4 mg daily  Orders:    PSA; Future    Smoking greater than 40 pack years  Smoking half pack to a pack of cigarettes a day continue to aggressively advocate for smoking cessation as a pertains to his lung nodules emphysema cardiac disease and severe peripheral vascular disease continue with lung cancer screening ordered today for continued active surveillance  Orders:    CT lung screening low dose; Future

## 2025-07-25 NOTE — PROGRESS NOTES
"Subjective   Patient ID: Raoul Basurto is a 64 y.o. male who presents for Follow-up.    HPI     Review of Systems    Objective   /70   Pulse 60   Ht 1.702 m (5' 7\")   Wt 75.8 kg (167 lb)   BMI 26.16 kg/m²     Physical Exam    Assessment/Plan          "

## 2025-08-15 ENCOUNTER — APPOINTMENT (OUTPATIENT)
Dept: CARDIOLOGY | Facility: HOSPITAL | Age: 65
End: 2025-08-15
Payer: COMMERCIAL

## 2025-09-04 ENCOUNTER — TELEPHONE (OUTPATIENT)
Dept: PRIMARY CARE | Facility: CLINIC | Age: 65
End: 2025-09-04
Payer: COMMERCIAL

## 2025-09-04 DIAGNOSIS — J06.9 UPPER RESPIRATORY TRACT INFECTION, UNSPECIFIED TYPE: ICD-10-CM

## 2025-09-04 RX ORDER — FLUTICASONE PROPIONATE 50 MCG
1 SPRAY, SUSPENSION (ML) NASAL DAILY
Qty: 16 G | Refills: 11 | Status: SHIPPED | OUTPATIENT
Start: 2025-09-04 | End: 2026-09-04

## 2026-01-05 ENCOUNTER — APPOINTMENT (OUTPATIENT)
Dept: PRIMARY CARE | Facility: CLINIC | Age: 66
End: 2026-01-05
Payer: COMMERCIAL

## 2026-01-27 ENCOUNTER — APPOINTMENT (OUTPATIENT)
Dept: PRIMARY CARE | Facility: CLINIC | Age: 66
End: 2026-01-27
Payer: COMMERCIAL